# Patient Record
Sex: FEMALE | Race: WHITE | Employment: OTHER | URBAN - METROPOLITAN AREA
[De-identification: names, ages, dates, MRNs, and addresses within clinical notes are randomized per-mention and may not be internally consistent; named-entity substitution may affect disease eponyms.]

---

## 2018-05-29 ENCOUNTER — HOSPITAL ENCOUNTER (OUTPATIENT)
Dept: PHYSICAL THERAPY | Age: 68
Discharge: HOME | End: 2018-05-29
Payer: MEDICARE

## 2018-05-29 PROCEDURE — 97140 MANUAL THERAPY 1/> REGIONS: CPT | Performed by: PHYSICAL THERAPIST

## 2018-05-29 PROCEDURE — G8984 CARRY CURRENT STATUS: HCPCS | Performed by: PHYSICAL THERAPIST

## 2018-05-29 PROCEDURE — 97162 PT EVAL MOD COMPLEX 30 MIN: CPT | Performed by: PHYSICAL THERAPIST

## 2018-05-29 PROCEDURE — G8985 CARRY GOAL STATUS: HCPCS | Performed by: PHYSICAL THERAPIST

## 2018-05-29 PROCEDURE — 97110 THERAPEUTIC EXERCISES: CPT | Performed by: PHYSICAL THERAPIST

## 2018-05-29 NOTE — PROGRESS NOTES
PT DAILY TREATMENT NOTE - [facility]     Patient Name: Dayan Mariano  Date:2018  : 1950  [x]  Patient  Verified  Payor: Simeon Man / Plan: [place] MEDICARE PART A & B / Product Type: Medicare /    In time:1204  Out time:1238  Total Treatment Time (min): 34  Total Timed Codes (min): 25  1:1 Treatment Time ( [address] only): 34   Visit #: 1 of 16    Treatment Area: Left shoulder pain [M25.512]    SUBJECTIVE  Pain Level (0-10 scale): 0/10  Any medication changes, allergies to medications, adverse drug reactions, diagnosis change, or new procedure performed?: [x] No    [] Yes (see summary sheet for update)  Subjective functional status/changes:   [] No changes reported  HPI: Pt c/o decreased mobility but limited pain in the left shoulder s/p TSA on 18. Reports minimal pain following sx and completion of HHPT x3 weeks w/ DC yesterday. Reports she is ambulating around the house w/o sling as instructed by surgeon but continues w/ sling use for community navigation and sleeping. Reports use of ice to alleviate soreness and notes she only really gets pain if she makes a quick movement. Reports she is retired and lives w/ her  who is able to assist w/ ADLs as necessary. Reports hx of right shoulder scope and scoliosis. States she had a left shoulder scope in 2017 w/ cortisone injection that kept her pain away for 8-10 weeks.      OBJECTIVE    Modality rationale:  to improve the patients ability to    Min Type Additional Details    [] Estim:  []Unatt       []IFC  []Premod                        []Other:  []w/ice   []w/heat  Position:  Location:    [] Estim: []Att    []TENS instruct  []NMES                    []Other:  []w/US   []w/ice   []w/heat  Position:  Location:    []  Traction: [] Cervical       []Lumbar                       [] Prone          []Supine                       []Intermittent   []Continuous Lbs:  [] before manual  [] after manual    []  Ultrasound: []Continuous   [] Pulsed []1MHz   []3MHz W/cm2:  Location:    []  Iontophoresis with dexamethasone         Location: [] Take home patch   [] In clinic    []  Ice     []  heat  []  Ice massage  []  Laser   []  Anodyne Position:  Location:    []  Laser with stim  []  Other:  Position:  Location:    []  Vasopneumatic Device Pressure:       [] lo [] med [] hi   Temperature: [] lo [] med [] hi   [] Skin assessment post-treatment:  []intact []redness- no adverse reaction    []redness  adverse reaction:     9 min [x]Eval                  []Re-Eval       15 min Therapeutic Exercise:  [] See flow sheet : reviewed and progressed HEP, educated in mechanics, scar massage, and avoiding pain   Rationale: increase ROM, increase strength, improve coordination and increase proprioception to improve the patients ability to improve pain and mobility      min Therapeutic Activity:  []  See flow sheet :   Rationale:   to improve the patients ability to       min Neuromuscular Re-education:  []  See flow sheet :   Rationale:   to improve the patients ability to     10 min Manual Therapy:  STJ mobs, TPR to left UT and LS, manual str in pain free protocol approved ranges flex and scap, ER to neutral at neutral   Rationale: decrease pain, increase ROM, increase tissue extensibility, decrease trigger points and increase postural awareness to decrease pain and improve activity tolerance      min Gait Training:  ___ feet with ___ device on level surfaces with ___ level of assist   Rationale: With   [] TE   [] TA   [] neuro   [] other: Patient Education: [x] Review HEP    [] Progressed/Changed HEP based on:   [] positioning   [] body mechanics   [] transfers   [] heat/ice application    [] other:      Other Objective/Functional Measures: Pt presents w/ sling correctly donned. Incision is healing well w/o scabs or signs of infection. PROM left shoulder flex to 120 deg scap to 90 deg, ER to neutral at neutral. Full elbow and wrist AROM noted. Cervical SB to the right is limited about 25% w/ pulling reported in the left neck and upper shoulder. Pt able to demonstrate correct pendulums and good transfers. Pain Level (0-10 scale) post treatment: 0/10    ASSESSMENT/Changes in Function: Focus on restoring AROM and strength to left shoulder following protocol restrictions. Patient will continue to benefit from skilled PT services to modify and progress therapeutic interventions, address functional mobility deficits, address ROM deficits, address strength deficits, analyze and address soft tissue restrictions, analyze and cue movement patterns, analyze and modify body mechanics/ergonomics, assess and modify postural abnormalities and instruct in home and community integration to attain remaining goals. [x]  See Plan of Care  []  See progress note/recertification  []  See Discharge Summary         Progress towards goals / Updated goals:  Short Term Goals: To be accomplished in 3 weeks:  1. Pt will be independent and complaint w/ HEP to progress gains in PT. At eval: reviewed and progressed HEP  2. Pt will improve PROM to full flex and scap left shoulder for ease w/ sleeping. At eval: PROM left shoulder flex to 120 deg, scap to 90 deg  3. Pt will improve AAROM to > or = to 120 deg flex and scap as measured on pulleys for ease w/ dressing. At eval: AAROM flex supine w/ wand = 140 deg, scap standing w/ wand 60 deg  Long Term Goals: To be accomplished in 8 weeks:  1. Pt will improve FOTO to > or = to 63 to demo improved function. At eval: FOTO = 36  2. Pt will improve AROM right shoulder flex and scap to > or = to 140 deg for ease w/ doing her hair. At eval: NT  3. Pt will improve AROM right shoulder functional ER to > or = to occiput for ease putting on her shirt. At eval: NT  4. Pt will improve MMT right shoulder to > or = to -4-4/5 grossly for ease w/ return to unrestricted ADLs.    At eval: NT    PLAN  []  Upgrade activities as tolerated []  Continue plan of care  []  Update interventions per flow sheet       []  Discharge due to:_  [x]  Other: 2x/8 weeks     Orlando Manjarrez, PT 5/29/2018  12:55 PM    Future Appointments  Date Time Provider Claire Johnson   6/1/2018 11:30 AM Escalante Shines, PTA [facility] [address]   6/6/2018 10:30 AM Escalante Shines, PTA [facility] [address]   6/12/2018 8:30 AM Murmoses Montelongo, PT [facility] [address]   6/14/2018 9:00 AM Escalante Shines, PTA [facility] [address]   6/19/2018 9:30 AM Escalante Shines, PTA [facility] [address]   6/21/2018 8:30 AM Denise Montelongo, PT [facility] [address]   6/26/2018 8:30 AM Escalante Shines, PTA [facility] [address]   6/29/2018 8:30 AM Escalante Shines, PTA [facility] [address]   7/3/2018 8:30 AM Murmoses Montelongo, PT [facility] [address]   7/6/2018 8:30 AM Escalante Shines, PTA [facility] [address]   7/10/2018 8:30 AM Escalante Shines, PTA [facility] [address]   7/13/2018 8:30 AM Escalante Shines, PTA [facility] [address]   7/17/2018 8:30 AM Escalante Shines, PTA [facility] [address]   7/19/2018 9:30 AM Orlando Manjarrez, PT [facility] [address]   7/24/2018 8:30 AM Escalante Shines, PTA [facility] [address]   7/26/2018 8:30 AM Denise Montelongo, PT [facility] [address]

## 2018-05-29 NOTE — PROGRESS NOTES
[facility]              [address]        [place], [address]   [phone] fax    Plan of Care/ Statement of Necessity for Physical Therapy Services    Patient name: Silvino Duong Start of Care: 2018   Referral source: Xu Huang, * : 1950    Medical Diagnosis: Left shoulder pain [M25.512]   Onset Date:18    Treatment Diagnosis: s/p right TSA   Prior Hospitalization: see medical history Provider#: [id number]   Medications: Verified on Patient summary List    Comorbidities: arthritis, back pain, GI disease, HBP, sleep dysfunction    Prior Level of Function: Hx of left shoulder pain, right hand dominant, (I) w/ ADLs and MADLs, retired       [employer] of Care and following information is based on the information from the initial evaluation. Assessment/ key information: Pt is a 68 y/o female w/c/o decreased mobility but limited pain in the left shoulder s/p TSA on 18. Reports minimal pain following sx and completion of HHPT x3 weeks w/ DC yesterday. Reports she is ambulating around the house w/o sling as instructed by surgeon but continues w/ sling use for community navigation and sleeping. Reports use of ice to alleviate soreness and notes she only really gets pain if she makes a quick movement. Reports she is retired and lives w/ her  who is able to assist w/ ADLs as necessary. Reports hx of right shoulder scope and scoliosis. States she had a left shoulder scope in 2017 w/ cortisone injection that kept her pain away for 8-10 weeks. Pt presents w/ sling correctly donned. Incision is healing well w/o scabs or signs of infection. PROM left shoulder flex to 120 deg scap to 90 deg, ER to neutral at neutral. Full elbow and wrist AROM noted. Cervical SB to the right is limited about 25% w/ pulling reported in the left neck and upper shoulder. Pt able to demonstrate correct pendulums and good transfers.  Pt will benefit from skilled PT to address the deficits and progress with pt goals. Evaluation Complexity History MEDIUM  Complexity : 1-2 comorbidities / personal factors will impact the outcome/ POC ; Examination HIGH Complexity : 4+ Standardized tests and measures addressing body structure, function, activity limitation and / or participation in recreation  ;Presentation MEDIUM Complexity : Evolving with changing characteristics  ; Clinical Decision Making MEDIUM Complexity : FOTO score of 26-74  Overall Complexity Rating: MEDIUM  Problem List: pain affecting function, decrease ROM, decrease strength, decrease ADL/ functional abilitiies, decrease activity tolerance and decrease flexibility/ joint mobility   Treatment Plan may include any combination of the following: Therapeutic exercise, Therapeutic activities, Neuromuscular re-education, Physical agent/modality, Manual therapy, Patient education and Functional mobility training  Patient / Family readiness to learn indicated by: asking questions, trying to perform skills and interest  Persons(s) to be included in education: patient (P)  Barriers to Learning/Limitations: None  Patient Goal (s): get back to normal  Patient Self Reported Health Status: excellent  Rehabilitation Potential: good    Short Term Goals: To be accomplished in 3 weeks:  1. Pt will be independent and complaint w/ HEP to progress gains in PT.   2. Pt will improve PROM to full flex and scap left shoulder for ease w/ sleeping. 3. Pt will improve AAROM to > or = to 120 deg flex and scap as measured on pulleys for ease w/ dressing. Long Term Goals: To be accomplished in 8 weeks:  1. Pt will improve FOTO to > or = to 63 to demo improved function. 2. Pt will improve AROM right shoulder flex and scap to > or = to 140 deg for ease w/ doing her hair. 3. Pt will improve AROM right shoulder functional ER to > or = to occiput for ease putting on her shirt.    4. Pt will improve MMT right shoulder to > or = to -4-4/5 grossly for ease w/ return to unrestricted ADLs. Frequency / Duration: Patient to be seen 2 times per week for 8 weeks. Patient/ Caregiver education and instruction: Diagnosis, prognosis, activity modification and exercises   [x]  Plan of care has been reviewed with PTA    G-Codes (GP)  Carry   Current  CL= 60-79%    Goal  CJ= 20-39%    The severity rating is based on clinical judgment and the FOTO score. Certification Period: 5/29/18 - 8/27/18  Fozia Freire, PT 5/29/2018 1:30 PM  ________________________________________________________________________    I certify that the above Therapy Services are being furnished while the patient is under my care. I agree with the treatment plan and certify that this therapy is necessary.     [de-identified] Signature:____________________  Date:____________Time: _________    Please sign and return to [facility]              [address], [address]   [phone] fax

## 2018-06-01 ENCOUNTER — HOSPITAL ENCOUNTER (OUTPATIENT)
Dept: PHYSICAL THERAPY | Age: 68
Discharge: HOME | End: 2018-06-01
Payer: MEDICARE

## 2018-06-01 PROCEDURE — 97140 MANUAL THERAPY 1/> REGIONS: CPT

## 2018-06-01 PROCEDURE — 97110 THERAPEUTIC EXERCISES: CPT

## 2018-06-01 NOTE — PROGRESS NOTES
PT DAILY TREATMENT NOTE - [facility]     Patient Name: Brionna Calix  Date:2018  : 1950  [x]  Patient  Verified  Payor: [place] MEDICARE / Plan: [place] MEDICARE PART A & B / Product Type: Medicare /    In time:11:28  Out time:12:15  Total Treatment Time (min): 47  Total Timed Codes (min): 47  1:1 Treatment Time ( [address] only): 25   Visit #: 2 of 16    Treatment Area: Left shoulder pain [M25.512]    SUBJECTIVE  Pain Level (0-10 scale): 0  Any medication changes, allergies to medications, adverse drug reactions, diagnosis change, or new procedure performed?: [x] No    [] Yes (see summary sheet for update)  Subjective functional status/changes:   [] No changes reported  Pt reports that her arm is feeling and doing well. OBJECTIVE        Min Type Additional Details    [] Estim:  []Unatt       []IFC  []Premod                        []Other:  []w/ice   []w/heat  Position:  Location:    [] Estim: []Att    []TENS instruct  []NMES                    []Other:  []w/US   []w/ice   []w/heat  Position:  Location:    []  Traction: [] Cervical       []Lumbar                       [] Prone          []Supine                       []Intermittent   []Continuous Lbs:  [] before manual  [] after manual    []  Ultrasound: []Continuous   [] Pulsed                           []1MHz   []3MHz W/cm2:  Location:    []  Iontophoresis with dexamethasone         Location: [] Take home patch   [] In clinic    []  Ice     []  heat  []  Ice massage  []  Laser   []  Anodyne Position:  Location:    []  Laser with stim  []  Other:  Position:  Location:    []  Vasopneumatic Device Pressure:       [] lo [] med [] hi   Temperature: [] lo [] med [] hi   [] Skin assessment post-treatment:  []intact []redness- no adverse reaction    []redness  adverse reaction:       37 min Therapeutic Exercise:  [x] See flow sheet :   Rationale: increase ROM and increase strength to improve the patients ability to increase tolerance to activities.        10 min Manual Therapy:  STJ mobs, TPR to left UT and LS, manual str in pain free protocol approved ranges flex and scap, ER to neutral at neutral   Rationale: decrease pain, increase ROM, increase tissue extensibility and decrease trigger points to increase ease with ADLs. With   [] TE   [] TA   [] neuro   [] other: Patient Education: [x] Review HEP    [] Progressed/Changed HEP based on:   [] positioning   [] body mechanics   [] transfers   [] heat/ice application    [] other:      Other Objective/Functional Measures: Pt reports performing HEP     Pain Level (0-10 scale) post treatment: 0    ASSESSMENT/Changes in Function: Initiated exercises per POC. Pt responded well to exercises with no increase in pain. Patient will continue to benefit from skilled PT services to modify and progress therapeutic interventions, address functional mobility deficits, address ROM deficits, address strength deficits and analyze and address soft tissue restrictions to attain remaining goals. []  See Plan of Care  []  See progress note/recertification  []  See Discharge Summary         Progress towards goals / Updated goals:  Short Term Goals: To be accomplished in 3 weeks:  1. Pt will be independent and complaint w/ HEP to progress gains in PT. At eval: reviewed and progressed HEP  Current: Goal met: Pt reports performing HEP. 6/1/18  2. Pt will improve PROM to full flex and scap left shoulder for ease w/ sleeping. At eval: PROM left shoulder flex to 120 deg, scap to 90 deg  3. Pt will improve AAROM to > or = to 120 deg flex and scap as measured on pulleys for ease w/ dressing. At eval: AAROM flex supine w/ wand = 140 deg, scap standing w/ wand 60 deg  Long Term Goals: To be accomplished in 8 weeks:  1. Pt will improve FOTO to > or = to 63 to demo improved function. At eval: FOTO = 36  2. Pt will improve AROM right shoulder flex and scap to > or = to 140 deg for ease w/ doing her hair. At eval: NT  3.  Pt will improve AROM right shoulder functional ER to > or = to occiput for ease putting on her shirt. At eval: NT  4. Pt will improve MMT right shoulder to > or = to -4-4/5 grossly for ease w/ return to unrestricted ADLs.    At eval: NT    PLAN  []  Upgrade activities as tolerated     [x]  Continue plan of care  []  Update interventions per flow sheet       []  Discharge due to:_  []  Other:_      Boris Roy PTA 6/1/2018  11:38 AM    Future Appointments  Date Time Provider Claire Johnson   6/6/2018 10:30 AM Boris Roy, PTA [facility]   6/12/2018 8:30 AM Denise Montelongo, PT [facility]   6/14/2018 9:00 AM Boris Roy, PTA [facility]   6/19/2018 9:30 AM Boris Roy, PTA [facility]   6/21/2018 8:30 AM Denise Montelongo, PT [facility]   6/26/2018 8:30 AM Boris Roy, PTA [facility]   6/29/2018 8:30 AM Boris Roy, PTA [facility]   7/3/2018 8:30 AM Denise Montelongo, PT [facility]   7/6/2018 8:30 AM Escalante Kirill, PTA [facility]   7/10/2018 8:30 AM Escalante Kirill, PTA [facility]   7/13/2018 8:30 AM Escalante Kirill, PTA [facility]   7/17/2018 8:30 AM Boris Roy, PTA [facility]   7/19/2018 9:30 AM Orlando Manjarrez, PT [facility]   7/24/2018 8:30 AM Boris Roy, PTA [facility]   7/26/2018 8:30 AM Denise Montelongo PT [facility]

## 2018-06-06 ENCOUNTER — HOSPITAL ENCOUNTER (OUTPATIENT)
Dept: PHYSICAL THERAPY | Age: 68
Discharge: HOME | End: 2018-06-06
Payer: MEDICARE

## 2018-06-06 PROCEDURE — 97110 THERAPEUTIC EXERCISES: CPT

## 2018-06-06 PROCEDURE — 97140 MANUAL THERAPY 1/> REGIONS: CPT

## 2018-06-06 NOTE — PROGRESS NOTES
PT DAILY TREATMENT NOTE - [facility]     Patient Name: Gilberto Yancey  Date:2018  : 1950  [x]  Patient  Verified  Payor: VA MEDICARE / Plan: [place] MEDICARE PART A & B / Product Type: Medicare /    In time:10:26  Out time:11:10  Total Treatment Time (min): 44  Total Timed Codes (min): 44  1:1 Treatment Time ( [address] only): 35   Visit #: 3 of 16    Treatment Area: Left shoulder pain [M25.512]    SUBJECTIVE  Pain Level (0-10 scale): 0  Any medication changes, allergies to medications, adverse drug reactions, diagnosis change, or new procedure performed?: [x] No    [] Yes (see summary sheet for update)  Subjective functional status/changes:   [] No changes reported  Pt reports that after last session late that night her arm was achy. OBJECTIVE        Min Type Additional Details    [] Estim:  []Unatt       []IFC  []Premod                        []Other:  []w/ice   []w/heat  Position:  Location:    [] Estim: []Att    []TENS instruct  []NMES                    []Other:  []w/US   []w/ice   []w/heat  Position:  Location:    []  Traction: [] Cervical       []Lumbar                       [] Prone          []Supine                       []Intermittent   []Continuous Lbs:  [] before manual  [] after manual    []  Ultrasound: []Continuous   [] Pulsed                           []1MHz   []3MHz W/cm2:  Location:    []  Iontophoresis with dexamethasone         Location: [] Take home patch   [] In clinic    []  Ice     []  heat  []  Ice massage  []  Laser   []  Anodyne Position:  Location:    []  Laser with stim  []  Other:  Position:  Location:    []  Vasopneumatic Device Pressure:       [] lo [] med [] hi   Temperature: [] lo [] med [] hi   [] Skin assessment post-treatment:  []intact []redness- no adverse reaction    []redness  adverse reaction:           34 min Therapeutic Exercise:  [x] See flow sheet :   Rationale: increase ROM and increase strength to improve the patients ability to increase tolerance to activities.       10 min Manual Therapy:  STJ mobs, TPR to left UT and LS, manual str in pain free protocol approved ranges flex and scap, ER to neutral at neutral   Rationale: decrease pain, increase ROM, increase tissue extensibility and decrease trigger points to increase ease with ADLs.               With   [] TE   [] TA   [] neuro   [] other: Patient Education: [x] Review HEP    [] Progressed/Changed HEP based on:   [] positioning   [] body mechanics   [] transfers   [] heat/ice application    [] other:      Other Objective/Functional Measures: PROM left shoulder flexion 130 deg, scap to 90 deg     Pain Level (0-10 scale) post treatment: 0    ASSESSMENT/Changes in Function: Continue exercises per flow sheet to follow protocol. With PROM flexion pt tightness with scapular winging. Patient will continue to benefit from skilled PT services to modify and progress therapeutic interventions, address functional mobility deficits, address ROM deficits, address strength deficits and analyze and address soft tissue restrictions to attain remaining goals. []  See Plan of Care  []  See progress note/recertification  []  See Discharge Summary         Progress towards goals / Updated goals:  Short Term Goals: To be accomplished in 3 weeks:  1. Pt will be independent and complaint w/ HEP to progress gains in PT. At eval: reviewed and progressed HEP  Current: Goal met: Pt reports performing HEP. 6/1/18  2. Pt will improve PROM to full flex and scap left shoulder for ease w/ sleeping. At eval: PROM left shoulder flex to 120 deg, scap to 90 deg  Current: Not met:  PROM left shoulder flexion 130 deg, scap to 90 deg. 6/6/18  3. Pt will improve AAROM to > or = to 120 deg flex and scap as measured on pulleys for ease w/ dressing. At eval: AAROM flex supine w/ wand = 140 deg, scap standing w/ wand 60 deg  Long Term Goals: To be accomplished in 8 weeks:  1. Pt will improve FOTO to > or = to 63 to demo improved function.    At eval: FOTO = 36  2. Pt will improve AROM right shoulder flex and scap to > or = to 140 deg for ease w/ doing her hair. At eval: NT  3. Pt will improve AROM right shoulder functional ER to > or = to occiput for ease putting on her shirt. At eval: NT  4. Pt will improve MMT right shoulder to > or = to -4-4/5 grossly for ease w/ return to unrestricted ADLs.    At eval: NT    PLAN  []  Upgrade activities as tolerated     [x]  Continue plan of care  []  Update interventions per flow sheet       []  Discharge due to:_  []  Other:_      Rhoda Janice, PTA 6/6/2018  10:42 AM    Future Appointments  Date Time Provider Claire Johnson   6/7/2018 8:30 AM Melquiades Owens, PT [facility]   6/12/2018 8:30 AM Melquiades Owens, PT [facility]   6/14/2018 9:00 AM Rhoda Janice, PTA [facility]   6/19/2018 11:00 AM Melquiades Owens, PT [facility]   6/21/2018 8:30 AM Melquiades Owens, PT [facility]   6/26/2018 8:30 AM Rhoda Janice, PTA [facility]   6/29/2018 8:30 AM Rhoda Janice, PTA [facility]   7/3/2018 8:30 AM Melquiades Owens, PT [facility]   7/6/2018 8:30 AM Rhoda Janice, PTA [facility]   7/10/2018 8:30 AM Rhoda Janice, PTA [facility]   7/13/2018 8:30 AM Rhoda Janice, PTA [facility]   7/17/2018 8:30 AM Rhoda Janice, PTA [facility]   7/19/2018 9:30 AM Shilpa Solares, PT [facility]   7/24/2018 8:30 AM Rhoda Camacho, PTA [facility]   7/26/2018 8:30 AM Melquiades Owens, PT [facility]

## 2018-06-07 ENCOUNTER — HOSPITAL ENCOUNTER (OUTPATIENT)
Dept: PHYSICAL THERAPY | Age: 68
Discharge: HOME | End: 2018-06-07
Payer: MEDICARE

## 2018-06-07 PROCEDURE — 97112 NEUROMUSCULAR REEDUCATION: CPT

## 2018-06-07 PROCEDURE — 97110 THERAPEUTIC EXERCISES: CPT

## 2018-06-07 PROCEDURE — 97140 MANUAL THERAPY 1/> REGIONS: CPT

## 2018-06-07 NOTE — PROGRESS NOTES
PT DAILY TREATMENT NOTE - [facility]     Patient Name: Silvino Duong  Date:2018  : 1950  [x]  Patient  Verified  Payor: Carmina Hidalgo / Plan: [place] MED[place] & B / Product Type: Medicare /    In time:0830  Out GCYO:030  Total Treatment Time (min): 44  Total Timed Codes (min): 44  1:1 Treatment Time ( only): 40   Visit #: 4 of 16    Treatment Area: Left shoulder pain [M25.512]    SUBJECTIVE  Pain Level (0-10 scale): 0  Any medication changes, allergies to medications, adverse drug reactions, diagnosis change, or new procedure performed?: [x] No    [] Yes (see summary sheet for update)  Subjective functional status/changes:   [] No changes reported  Patient reports continued difficulty with sleep. OBJECTIVE    20 min Therapeutic Exercise:  [x] See flow sheet : Emphasis placed on improving available shoulder AROM and strength of the rotator cuff muscles   Rationale: increase ROM and increase strength to improve the patients ability to improve ease with household ADLs    14 min Neuromuscular Re-education:  [x]  See flow sheet : Emphasis placed on improving activation and recruitment of the glenohumeral and scapulothoracic musculature and improving scapulohumeral mechanics with UE elevation   Rationale: increase ROM, increase strength and increase proprioception  to improve the patients ability to improve ease with return to exercise.     10 min Manual Therapy:    Supine, Left Scapular Medial Grade IV Mobilization with 70 deg Shoulder Scap  Supine, Left Shoulder Scaption Grade II-III  Physiological Mobilization  Supine, Left Shoulder Flexion Grade II-III Physiological Mobilization  Supine, Left Shoulder ER PROM (0 deg abd) - To 15deg  Right Sidelying, Left Scapular PNF with Isometric and Concentric Resistance   Rationale: decrease pain, increase ROM and increase tissue extensibility to improve ease with overhead ADLs    With   [] TE   [] TA   [] neuro   [] other: Patient Education: [x] Review HEP    [] Progressed/Changed HEP based on:   [] positioning   [] body mechanics   [] transfers   [] heat/ice application    [] other:      Pain Level (0-10 scale) post treatment: 0    ASSESSMENT/Changes in Function: Continued diminished scapulohumeral dissociation with shoulder PROM >90 degrees elevation. Patient to be 5 weeks post-operative Monday, 6/12/2018 with progression of within clinic treatment per protocol. Patient will continue to benefit from skilled PT services to modify and progress therapeutic interventions, address functional mobility deficits, address ROM deficits, address strength deficits, analyze and address soft tissue restrictions and analyze and cue movement patterns to attain remaining goals. []  See Plan of Care  []  See progress note/recertification  []  See Discharge Summary         Progress towards goals / Updated goals:    Short Term Goals: To be accomplished in 3 weeks:  1. Pt will be independent and complaint w/ HEP to progress gains in PT. At eval: reviewed and progressed HEP  Current: Goal met: Pt reports performing HEP. 6/1/18  2. Pt will improve PROM to full flex and scap left shoulder for ease w/ sleeping. At eval: PROM left shoulder flex to 120 deg, scap to 90 deg  Current: Not met:  PROM left shoulder flexion 130 deg, scap to 90 deg. 6/6/18  3. Pt will improve AAROM to > or = to 120 deg flex and scap as measured on pulleys for ease w/ dressing. At eval: AAROM flex supine w/ wand = 140 deg, scap standing w/ wand 60 deg  Long Term Goals: To be accomplished in 8 weeks:  1. Pt will improve FOTO to > or = to 63 to demo improved function. At eval: FOTO = 36  2. Pt will improve AROM right shoulder flex and scap to > or = to 140 deg for ease w/ doing her hair. At eval: NT  3. Pt will improve AROM right shoulder functional ER to > or = to occiput for ease putting on her shirt. At eval: NT  4.  Pt will improve MMT right shoulder to > or = to -4-4/5 grossly for ease w/ return to unrestricted ADLs.    At eval: NT    PLAN  []  Upgrade activities as tolerated     []  Continue plan of care  []  Update interventions per flow sheet       []  Discharge due to:_  []  Other:_      Marilyn Lanza, PT 6/7/2018  7:57 AM    Future Appointments  Date Time Provider Claire Elizabeth   6/7/2018 8:30 AM Marilyn Lanza, PT [facility]   6/12/2018 8:30 AM Marilyn Lanza, PT [facility]   6/14/2018 9:00 AM Pretty Main, PTA [facility]   6/19/2018 11:00 AM Marilyn Lanza, PT [facility]   6/21/2018 8:30 AM Marilyn Lanza, PT [facility]   6/26/2018 8:30 AM Pretty Main, PTA [facility]   6/29/2018 8:30 AM Marilyn Lanza, PT [facility]   7/3/2018 8:30 AM Marilyn Lanza, PT [facility]   7/6/2018 8:30 AM Pretty Main, PTA [facility]   7/10/2018 8:30 AM Pretty Main, PTA [facility]   7/13/2018 8:30 AM Pretty Main, PTA [facility]   7/17/2018 8:30 AM Pretty Main, PTA [facility]   7/19/2018 9:30 AM Elighnano Martinez, PT [facility]   7/24/2018 8:30 AM Pretty Main, PTA [facility]   7/26/2018 8:30 AM Marilyn Lanza, PT [facility]

## 2018-06-12 ENCOUNTER — HOSPITAL ENCOUNTER (OUTPATIENT)
Dept: PHYSICAL THERAPY | Age: 68
Discharge: HOME | End: 2018-06-12
Payer: MEDICARE

## 2018-06-12 PROCEDURE — 97112 NEUROMUSCULAR REEDUCATION: CPT

## 2018-06-12 PROCEDURE — 97110 THERAPEUTIC EXERCISES: CPT

## 2018-06-12 PROCEDURE — 97140 MANUAL THERAPY 1/> REGIONS: CPT

## 2018-06-12 NOTE — PROGRESS NOTES
PT DAILY TREATMENT NOTE - [facility]     Patient Name: Giana Gray  Date:2018  : 1950  [x]  Patient  Verified  Payor: Zandra Plan / Plan: [place] MEDICARE PART A & B / Product Type: Medicare /    In time:830  Out time:918  Total Treatment Time (min): 48  Total Timed Codes (min): 48  1:1 Treatment Time ( [address] only): 40   Visit #: 5 of 16    Treatment Area: Left shoulder pain [M25.512]    SUBJECTIVE  Pain Level (0-10 scale): 0  Any medication changes, allergies to medications, adverse drug reactions, diagnosis change, or new procedure performed?: [x] No    [] Yes (see summary sheet for update)  Subjective functional status/changes:   [] No changes reported  Patient reports desire to return back to driving upon MD follow up next week.     OBJECTIVE    20 min Therapeutic Exercise:  [x] See flow sheet : Emphasis placed on improving available shoulder AROM and strength of the rotator cuff muscles   Rationale: increase ROM and increase strength to improve the patients ability to improve ease with household ADLs     16 min Neuromuscular Re-education:  [x]  See flow sheet : Emphasis placed on improving activation and recruitment of the glenohumeral and scapulothoracic musculature and improving scapulohumeral mechanics with UE elevation   Rationale: increase ROM, increase strength and increase proprioception  to improve the patients ability to improve ease with return to exercise.     12 min Manual Therapy:    Supine, Left Scapular Medial Grade IV Mobilization with 70 deg Shoulder Scap  Supine, Left Shoulder Scaption Grade II-III  Physiological Mobilization  Supine, Left Shoulder Flexion Grade II-III Physiological Mobilization  Supine, Left Shoulder ER PROM (0 deg abd) - To 15deg  Supine, Left UE Multi-Directional Rhythmic Stabilization (90 deg flexion, scapular protraction - Applied at wrist)   Rationale: decrease pain, increase ROM and increase tissue extensibility to improve ease with overhead ADLs        With   [] TE   [] TA   [] neuro   [] other: Patient Education: [x] Review HEP    [] Progressed/Changed HEP based on:   [] positioning   [] body mechanics   [] transfers   [] heat/ice application    [] other:      Pain Level (0-10 scale) post treatment: 1    ASSESSMENT/Changes in Function: Good post-surgical improvement in left shoulder PROM demonstrated with continued empty end-feels noted at end-range. With patient >5 weeks post-operative further progressed exercises ensuring maintenance of scapulohumeral rhythm with progression. Patient will continue to benefit from skilled PT services to modify and progress therapeutic interventions, address functional mobility deficits, address ROM deficits, address strength deficits, analyze and address soft tissue restrictions, analyze and cue movement patterns, analyze and modify body mechanics/ergonomics and assess and modify postural abnormalities to attain remaining goals. []  See Plan of Care  []  See progress note/recertification  []  See Discharge Summary         Progress towards goals / Updated goals:    Short Term Goals: To be accomplished in 3 weeks:  1. Pt will be independent and complaint w/ HEP to progress gains in PT. At eval: reviewed and progressed HEP  Current: Goal met: Pt reports performing HEP. 6/1/18  2. Pt will improve PROM to full flex and scap left shoulder for ease w/ sleeping. At eval: PROM left shoulder flex to 120 deg, scap to 90 deg  Current: Progressing, Flexion 130 degrees, Scaption 150 degrees, 6/12/2018  3. Pt will improve AAROM to > or = to 120 deg flex and scap as measured on pulleys for ease w/ dressing. At eval: AAROM flex supine w/ wand = 140 deg, scap standing w/ wand 60 deg  Long Term Goals: To be accomplished in 8 weeks:  1. Pt will improve FOTO to > or = to 63 to demo improved function. At eval: FOTO = 36  2. Pt will improve AROM right shoulder flex and scap to > or = to 140 deg for ease w/ doing her hair. At eval: NT  3.  Pt will improve AROM right shoulder functional ER to > or = to occiput for ease putting on her shirt. At eval: NT  4. Pt will improve MMT right shoulder to > or = to -4-4/5 grossly for ease w/ return to unrestricted ADLs.    At eval: NT    PLAN  []  Upgrade activities as tolerated     []  Continue plan of care  []  Update interventions per flow sheet       []  Discharge due to:_  []  Other:_      Donavan Walters PT 6/12/2018  8:19 AM    Future Appointments  Date Time Provider Claire Johnson   6/12/2018 8:30 AM Donavan Walters, PT [facility]   6/14/2018 9:00 AM Turner Galvan, PTA [facility]   6/19/2018 11:00 AM Donavan Walters, PT [facility]   6/21/2018 8:30 AM Donavan Walters, PT [facility]   6/26/2018 8:30 AM Turner Galvan, PTA [facility]   7/3/2018 8:30 AM Donavan Walters, PT [facility]   7/6/2018 8:30 AM Turner Galvan, PTA [facility]   7/10/2018 8:30 AM Turner Galvan, PTA [facility]   7/13/2018 8:30 AM Turner Galvan, PTA [facility]   7/17/2018 8:30 AM Turner Galvan, PTA [facility]   7/19/2018 9:30 AM Connie Velásquez, PT [facility]   7/24/2018 8:30 AM Turner Galvan, PTA [facility]   7/26/2018 8:30 AM Donavan Walters, PT [facility]

## 2018-06-14 ENCOUNTER — HOSPITAL ENCOUNTER (OUTPATIENT)
Dept: PHYSICAL THERAPY | Age: 68
Discharge: HOME | End: 2018-06-14
Payer: MEDICARE

## 2018-06-14 PROCEDURE — G8984 CARRY CURRENT STATUS: HCPCS

## 2018-06-14 PROCEDURE — 97140 MANUAL THERAPY 1/> REGIONS: CPT

## 2018-06-14 PROCEDURE — 97110 THERAPEUTIC EXERCISES: CPT

## 2018-06-14 PROCEDURE — G8985 CARRY GOAL STATUS: HCPCS

## 2018-06-14 NOTE — PROGRESS NOTES
[facility]              [address]        [place], [address]   [phone] fax    Medicare Progress Report    Patient name: Shala Aguayo Start of Care: 2018   Referral source: Pascale Gaviria, * : 1950   Medical/Treatment Diagnosis: Left shoulder pain [M25.512] Onset Date:2018     Prior Hospitalization: see medical history Provider#: [id number]   Medications: Verified on Patient Summary List    Comorbidities: arthritis, back pain, GI disease, HBP, sleep dysfunction    Prior Level of Function: Hx of left shoulder pain, right hand dominant, (I) w/ ADLs and MADLs, retired   Visits from [place] of Care: 6    Missed Visits: 0    Reporting Period: 2018 to 2018    Subjective Reports:     Short Term Goals: To be accomplished in 3 weeks:  1. Pt will be independent and complaint w/ HEP to progress gains in PT. At eval: reviewed and progressed HEP  At PN: Goal met: Pt reports performing HEP  2. Pt will improve PROM to full flex and scap left shoulder for ease w/ sleeping. At eval: PROM left shoulder flex to 120 deg, scap to 90 deg  At PN: Progressing, Flexion 130 degrees, Scaption 150 degrees  3. Pt will improve AAROM to > or = to 120 deg flex and scap as measured on pulleys for ease w/ dressing. At eval: AAROM flex supine w/ wand = 140 deg, scap standing w/ wand 60 deg  At PN: Goal met: AAROM flexion and scaption 125 deg with pulleys  Long Term Goals: To be accomplished in 8 weeks:  1. Pt will improve FOTO to > or = to 63 to demo improved function. At eval: FOTO = 36  At PN: Progressing: FOTO = 50, increased by 14 since [facility]  2. Pt will improve AROM right shoulder flex and scap to > or = to 140 deg for ease w/ doing her hair. At eval: NT  At PN: Progressing: AROM flexion 55 deg, scap 50 deg  3. Pt will improve AROM right shoulder functional ER to > or = to occiput for ease putting on her shirt. At eval: NT  4.  Pt will improve MMT right shoulder to > or = to -4-4/5 grossly for ease w/ return to unrestricted ADLs. At eval: NT    Key functional changes: See above goals. Problems/ barriers to goal attainment: None. Assessment / Recommendations:    Pt has progressed well toward LTGs. Pt's PROM left shoulder flexion 130 deg, scaption 150 deg. Pt's AAROM with pulleys flexion and scaption 125 deg. Pt's AROM flexion 55 deg and scaption 50 deg. Pt provided with updated HEP to progress isometric strengthening at home. Patient with good tolerance with progression of exercises in accordance with protocol.      Patient will continue to benefit from skilled PT services to modify and progress therapeutic interventions, address functional mobility deficits, address ROM deficits, address strength deficits and analyze and address soft tissue restrictions to attain remaining goals. Problem List: pain affecting function, decrease ROM, decrease strength, decrease ADL/ functional abilitiies, decrease activity tolerance and decrease flexibility/ joint mobility   Treatment Plan: Therapeutic exercise, Therapeutic activities, Neuromuscular re-education, Physical agent/modality, Manual therapy, Patient education, Self Care training and Functional mobility training    Updated Goals: Continue with unmet goals above. Frequency / Duration: Patient to be seen 2 times per week for 5 weeks:    G-Codes (GP)  Carry   Current  CL= 60-79%    Goal  CJ= 20-39%    The severity rating is based on clinical judgment and the FOTO score.       Mariella Mackenzie, PT 6/14/2018 2:00 PM

## 2018-06-14 NOTE — PROGRESS NOTES
PT DAILY TREATMENT NOTE - [facility]     Patient Name: Bhavya Albarran  Date:2018  : 1950  [x]  Patient  Verified  Payor: [place] / Plan: [place] MEDICARE PART A & B / Product Type: Medicare /    In time:8:58  Out time:10:03  Total Treatment Time (min): 65  Total Timed Codes (min): 65  1:1 Treatment Time ( [address] only): 40   Visit #: 6 of 16    Treatment Area: Left shoulder pain [M25.512]    SUBJECTIVE  Pain Level (0-10 scale): 0  Any medication changes, allergies to medications, adverse drug reactions, diagnosis change, or new procedure performed?: [x] No    [] Yes (see summary sheet for update)  Subjective functional status/changes:   [] No changes reported  Pt reports she is doing well with his exercises at home. OBJECTIVE        Min Type Additional Details    [] Estim:  []Unatt       []IFC  []Premod                        []Other:  []w/ice   []w/heat  Position:  Location:    [] Estim: []Att    []TENS instruct  []NMES                    []Other:  []w/US   []w/ice   []w/heat  Position:  Location:    []  Traction: [] Cervical       []Lumbar                       [] Prone          []Supine                       []Intermittent   []Continuous Lbs:  [] before manual  [] after manual    []  Ultrasound: []Continuous   [] Pulsed                           []1MHz   []3MHz W/cm2:  Location:    []  Iontophoresis with dexamethasone         Location: [] Take home patch   [] In clinic    []  Ice     []  heat  []  Ice massage  []  Laser   []  Anodyne Position:  Location:    []  Laser with stim  []  Other:  Position:  Location:    []  Vasopneumatic Device Pressure:       [] lo [] med [] hi   Temperature: [] lo [] med [] hi   [] Skin assessment post-treatment:  []intact []redness- no adverse reaction    []redness  adverse reaction:       55 min Therapeutic Exercise:  [x] See flow sheet :   Rationale: increase ROM and increase strength to improve the patients ability to increase tolerance to activities.            10 min Manual Therapy:  STJ mobs, TPR to left UT and LS, manual str in pain free protocol approved ranges flex and scap, ER to neutral at neutral   Rationale: decrease pain, increase ROM, increase tissue extensibility and decrease trigger points to increase ease with ADLs.                With   [] TE   [] TA   [] neuro   [] other: Patient Education: [x] Review HEP    [] Progressed/Changed HEP based on:   [] positioning   [] body mechanics   [] transfers   [] heat/ice application    [] other:      Other Objective/Functional Measures: see goals     Pain Level (0-10 scale) post treatment: 0    ASSESSMENT/Changes in Function: Pt has progressed well toward LTGs. Pt's PROM left shoulder flexion 130 deg, scaption 150 deg. Pt's AAROM with pulleys flexion and scaption 125 deg. Pt's AROM flexion 55 deg and scaption 50 deg. Pt provided with updated HEP to progress isometric strengthening at home. Patient will continue to benefit from skilled PT services to modify and progress therapeutic interventions, address functional mobility deficits, address ROM deficits, address strength deficits and analyze and address soft tissue restrictions to attain remaining goals. []  See Plan of Care  [x]  See progress note/recertification  []  See Discharge Summary         Progress towards goals / Updated goals:  Short Term Goals: To be accomplished in 3 weeks:  1. Pt will be independent and complaint w/ HEP to progress gains in PT. At eval: reviewed and progressed HEP  Current: Goal met: Pt reports performing HEP. 6/1/18  2. Pt will improve PROM to full flex and scap left shoulder for ease w/ sleeping. At eval: PROM left shoulder flex to 120 deg, scap to 90 deg  Current: Progressing, Flexion 130 degrees, Scaption 150 degrees, 6/12/2018  3. Pt will improve AAROM to > or = to 120 deg flex and scap as measured on pulleys for ease w/ dressing.    At eval: AAROM flex supine w/ wand = 140 deg, scap standing w/ wand 60 deg  Current: Goal met: AAROM flexion and scaption 125 deg with pulleys. 6/14/18  Long Term Goals: To be accomplished in 8 weeks:  1. Pt will improve FOTO to > or = to 63 to demo improved function. At eval: FOTO = 36  Current: Progressing: FOTO = 50, increased by 14 since [facility]. 6/14/18  2. Pt will improve AROM right shoulder flex and scap to > or = to 140 deg for ease w/ doing her hair. At eval: NT  Current: Progressing: AROM flexion 55 deg, scap 50 deg. 6/14/18  3. Pt will improve AROM right shoulder functional ER to > or = to occiput for ease putting on her shirt. At eval: NT  4. Pt will improve MMT right shoulder to > or = to -4-4/5 grossly for ease w/ return to unrestricted ADLs.    At eval: NT       PLAN  []  Upgrade activities as tolerated     [x]  Continue plan of care  []  Update interventions per flow sheet       []  Discharge due to:_  []  Other:_      Simon Epps PTA 6/14/2018  9:14 AM    Future Appointments  Date Time Provider Claire Johnson   6/19/2018 11:00 AM Abena Aponte PT [facility]   6/21/2018 8:30 AM Abena Aponte PT [facility]   6/26/2018 8:30 AM Simon Epps, PTA [facility]   7/3/2018 8:30 AM Abena Aponte PT [facility]   7/6/2018 8:30 AM Rico Mich, PTA [facility]   7/10/2018 8:30 AM Rico Mich, PTA [facility]   7/13/2018 8:30 AM Rico Mich, PTA [facility]   7/17/2018 8:30 AM Rico Mich, PTA [facility]   7/19/2018 9:30 AM Tia Nava PT [facility]   7/24/2018 8:30 AM Simon Epps, PTA [facility]   7/26/2018 8:30 AM Abena Aponte, TIMA [facility]

## 2018-06-19 ENCOUNTER — HOSPITAL ENCOUNTER (OUTPATIENT)
Dept: PHYSICAL THERAPY | Age: 68
Discharge: HOME | End: 2018-06-19
Payer: MEDICARE

## 2018-06-19 PROCEDURE — 97140 MANUAL THERAPY 1/> REGIONS: CPT

## 2018-06-19 PROCEDURE — 97112 NEUROMUSCULAR REEDUCATION: CPT

## 2018-06-19 NOTE — PROGRESS NOTES
PT DAILY TREATMENT NOTE - [facility]     Patient Name: Danielle Fox  Date:2018  : 1950  [x]  Patient  Verified  Payor: Heladio Fraction / Plan: [place] / Product Type: Medicare /    In time:1100  Out time:1140  Total Treatment Time (min): 40  Total Timed Codes (min): 40  1:1 Treatment Time ([facility] only): 30   Visit #: 7 of 16    Treatment Area: Left shoulder pain [M25.512]    SUBJECTIVE  Pain Level (0-10 scale): 0  Any medication changes, allergies to medications, adverse drug reactions, diagnosis change, or new procedure performed?: [x] No    [] Yes (see summary sheet for update)  Subjective functional status/changes:   [] No changes reported  Patient reports MD follow up with MD pleased with progression demonstrated    OBJECTIVE    10 min Therapeutic Exercise:  [x] See flow sheet : Emphasis placed on improving available shoulder AROM and strength of the rotator cuff muscles   Rationale: increase ROM and increase strength to improve the patients ability to improve ease with household ADLs      22 min Neuromuscular Re-education:  [x]  See flow sheet : Emphasis placed on improving activation and recruitment of the glenohumeral and scapulothoracic musculature and improving scapulohumeral mechanics with UE elevation   Rationale: increase ROM, increase strength and increase proprioception  to improve the patients ability to improve ease with return to exercise.      8 min Manual Therapy:    Supine, Left Scapular Medial Grade IV Mobilization with 70 deg Shoulder Scap  Supine, Left Shoulder Scaption Grade II-III  Physiological Mobilization  Supine, Left Shoulder Flexion Grade II-III Physiological Mobilization  Supine, Left Shoulder ER PROM (0 deg abd) - To 15deg  Supine, Left UE Multi-Directional Rhythmic Stabilization (90 deg flexion, scapular protraction - Applied at wrist)   Rationale: decrease pain, increase ROM and increase tissue extensibility to improve ease with overhead ADLs        With   [] TE   [] TA   [] neuro   [] other: Patient Education: [x] Review HEP    [] Progressed/Changed HEP based on:   [] positioning   [] body mechanics   [] transfers   [] heat/ice application    [] other:      Pain Level (0-10 scale) post treatment: 0    ASSESSMENT/Changes in Function: Initiated shoulder abduction AROM in sidelying with patient benefiting from manual cuing to correct scapulohumeral mechanics. Patient demonstrates improved scapulohumeral dissociation with shoulder PROM but continues to demonstrate poor scapulohumeral control with AROM with continued progression of scapulothoracic strengthening. Patient will continue to benefit from skilled PT services to modify and progress therapeutic interventions, address functional mobility deficits, address ROM deficits, address strength deficits, analyze and address soft tissue restrictions and analyze and cue movement patterns to attain remaining goals. []  See Plan of Care  []  See progress note/recertification  []  See Discharge Summary         Progress towards goals / Updated goals:    Short Term Goals: To be accomplished in 3 weeks:  1. Pt will be independent and complaint w/ HEP to progress gains in PT. At PN: Goal met: Pt reports performing HEP. 6/1/18  2. Pt will improve PROM to full flex and scap left shoulder for ease w/ sleeping. At PN: Progressing, Flexion 130 degrees, Scaption 150 degrees, 6/12/2018  3. Pt will improve AAROM to > or = to 120 deg flex and scap as measured on pulleys for ease w/ dressing. At PN: Goal met: AAROM flexion and scaption 125 deg with pulleys. 6/14/18  Long Term Goals: To be accomplished in 8 weeks:  1. Pt will improve FOTO to > or = to 63 to demo improved function. At PN: Progressing: FOTO = 50, increased by 14 since [facility]. 6/14/18  2. Pt will improve AROM right shoulder flex and scap to > or = to 140 deg for ease w/ doing her hair. At PN: Progressing: AROM flexion 55 deg, scap 50 deg. 6/14/18  3.  Pt will improve AROM right shoulder functional ER to > or = to occiput for ease putting on her shirt. At eval: NT  4. Pt will improve MMT right shoulder to > or = to -4-4/5 grossly for ease w/ return to unrestricted ADLs.    At eval: NT    PLAN  [x]  Upgrade activities as tolerated     [x]  Continue plan of care  []  Update interventions per flow sheet       []  Discharge due to:_  []  Other:_      Austin Macias PT 6/19/2018  8:24 AM    Future Appointments  Date Time Provider Claire Johnson   6/19/2018 11:00 AM Austin Macias, PT [facility]   6/21/2018 8:30 AM Austin Macias, PT [facility]   6/26/2018 8:30 AM Jasmina Blotter, PTA [facility]   7/3/2018 8:30 AM Austin Macias, PT [facility]   7/6/2018 8:30 AM Jasmina Blotter, PTA [facility]   7/10/2018 8:30 AM Jasmina Blotter, PTA [facility]   7/13/2018 8:30 AM Jasmina Blotter, PTA [facility]   7/17/2018 8:30 AM Jasmina Blotter, PTA [facility]   7/19/2018 9:30 AM Joyce Rodarte, PT [facility]   7/24/2018 8:30 AM Jasmina Blotter, PTA [facility]   7/26/2018 8:30 AM Austin Macias, PT [facility]

## 2018-06-21 ENCOUNTER — HOSPITAL ENCOUNTER (OUTPATIENT)
Dept: PHYSICAL THERAPY | Age: 68
Discharge: HOME | End: 2018-06-21
Payer: MEDICARE

## 2018-06-21 PROCEDURE — 97110 THERAPEUTIC EXERCISES: CPT

## 2018-06-21 PROCEDURE — 97140 MANUAL THERAPY 1/> REGIONS: CPT

## 2018-06-21 PROCEDURE — 97112 NEUROMUSCULAR REEDUCATION: CPT
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
## 2018-06-26 ENCOUNTER — HOSPITAL ENCOUNTER (OUTPATIENT)
Dept: PHYSICAL THERAPY | Age: 68
Discharge: HOME | End: 2018-06-26
Payer: MEDICARE

## 2018-06-26 PROCEDURE — 97110 THERAPEUTIC EXERCISES: CPT

## 2018-06-26 PROCEDURE — 97140 MANUAL THERAPY 1/> REGIONS: CPT

## 2018-06-26 PROCEDURE — 97112 NEUROMUSCULAR REEDUCATION: CPT

## 2018-06-26 NOTE — PROGRESS NOTES
PT DAILY TREATMENT NOTE - [facility]     Patient Name: Regino Perdomo  Date:2018  : 1950  [x]  Patient  Verified  Payor: Dominguez Rodriguez / Plan: [place] MEDICARE PART A & B / Product Type: Medicare /    In time:8:32  Out time:9:23  Total Treatment Time (min): 51  Total Timed Codes (min): 51  1:1 Treatment Time ( [address] only): 40   Visit #: 9 of 16    Treatment Area: Left shoulder pain [M25.512]    SUBJECTIVE  Pain Level (0-10 scale): 0  Any medication changes, allergies to medications, adverse drug reactions, diagnosis change, or new procedure performed?: [x] No    [] Yes (see summary sheet for update)  Subjective functional status/changes:   [] No changes reported  Pt reports her arm is feeling well today. OBJECTIVE        Min Type Additional Details    [] Estim:  []Unatt       []IFC  []Premod                        []Other:  []w/ice   []w/heat  Position:  Location:    [] Estim: []Att    []TENS instruct  []NMES                    []Other:  []w/US   []w/ice   []w/heat  Position:  Location:    []  Traction: [] Cervical       []Lumbar                       [] Prone          []Supine                       []Intermittent   []Continuous Lbs:  [] before manual  [] after manual    []  Ultrasound: []Continuous   [] Pulsed                           []1MHz   []3MHz W/cm2:  Location:    []  Iontophoresis with dexamethasone         Location: [] Take home patch   [] In clinic    []  Ice     []  heat  []  Ice massage  []  Laser   []  Anodyne Position:  Location:    []  Laser with stim  []  Other:  Position:  Location:    []  Vasopneumatic Device Pressure:       [] lo [] med [] hi   Temperature: [] lo [] med [] hi   [] Skin assessment post-treatment:  []intact []redness- no adverse reaction    []redness  adverse reaction:     26 min Therapeutic Exercise:  [x] See flow sheet :   Rationale: increase ROM and increase strength to improve the patients ability to increase tolerance to activities.         15 min Neuromuscular Re-education:  [x]  See flow sheet :scapular stabilization. Rationale: increase ROM and increase strength  to improve the patients ability to increase ease with lifting activities.     10 min Manual Therapy:  STJ mobs, TPR to left UT and LS, manual str in pain free protocol approved ranges flex and scap, ER, sub scap release. Rationale: decrease pain, increase ROM, increase tissue extensibility and decrease trigger points to increase ease with ADLs. With   [] TE   [] TA   [] neuro   [] other: Patient Education: [x] Review HEP    [] Progressed/Changed HEP based on:   [] positioning   [] body mechanics   [] transfers   [] heat/ice application    [] other:      Other Objective/Functional Measures: AROM flexion 55 deg, scap 45 deg. Pain Level (0-10 scale) post treatment: 0    ASSESSMENT/Changes in Function: Pt continues to be tight along subscap with causing scapular to lift with supine flexion. Pt continues to be challenged with AROM. Patient will continue to benefit from skilled PT services to modify and progress therapeutic interventions, address functional mobility deficits, address ROM deficits, address strength deficits and analyze and address soft tissue restrictions to attain remaining goals. []  See Plan of Care  []  See progress note/recertification  []  See Discharge Summary         Progress towards goals / Updated goals:  Short Term Goals: To be accomplished in 3 weeks:  1. Pt will be independent and complaint w/ HEP to progress gains in PT. At PN: Goal met: Pt reports performing HEP. 6/1/18  2. Pt will improve PROM to full flex and scap left shoulder for ease w/ sleeping. At PN: Progressing, Flexion 130 degrees, Scaption 150 degrees, 6/12/2018  3. Pt will improve AAROM to > or = to 120 deg flex and scap as measured on pulleys for ease w/ dressing. At PN: Goal met: AAROM flexion and scaption 125 deg with pulleys. 6/14/18  Long Term Goals: To be accomplished in 8 weeks:  1. Pt will improve FOTO to > or = to 63 to demo improved function. At PN: Progressing: FOTO = 50, increased by 14 since [facility]. 6/14/18  2. 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 At PN: Progressing: AROM flexion 55 deg, scap 50 deg. 6/14/18  Current: Regressed: AROM flexion 55 deg, scap 45 deg. 6/26/18  3. Pt will improve AROM right shoulder functional ER to > or = to occiput for ease putting on her shirt. At eval: NT  Current: Progressing, Lef Shoulder Functional ER = Left Ear, 6/21/2018  4. Pt will improve MMT left shoulder to > or = to -4-4/5 grossly for ease w/ return to unrestricted ADLs.    At eval: NT       PLAN  []  Upgrade activities as tolerated     [x]  Continue plan of care  []  Update interventions per flow sheet       []  Discharge due to:_  []  Other:_      Rhoda Janice, PTA 6/26/2018  8:48 AM    Future Appointments  Date Time Provider Claire Johnson   7/3/2018 8:30 AM Melquiades Owens, PT [facility]   7/6/2018 8:30 AM Rhoda Janice, PTA [facility]   7/10/2018 8:30 AM Rhoda Janice, PTA [facility]   7/13/2018 8:30 AM Rhoda Janice, PTA [facility]   7/17/2018 8:30 AM Rhoda Janice, PTA [facility]   7/19/2018 9:30 AM Shilpa Solares PT [facility]   7/24/2018 8:30 AM Rhoda Janice, PTA [facility]   7/26/2018 8:30 AM Melquiades Owens PT [facility]

## 2018-06-28 ENCOUNTER — HOSPITAL ENCOUNTER (OUTPATIENT)
Dept: PHYSICAL THERAPY | Age: 68
Discharge: HOME | End: 2018-06-28
Payer: MEDICARE

## 2018-06-28 PROCEDURE — 97112 NEUROMUSCULAR REEDUCATION: CPT

## 2018-06-28 PROCEDURE — 97110 THERAPEUTIC EXERCISES: CPT

## 2018-06-28 PROCEDURE — 97140 MANUAL THERAPY 1/> REGIONS: CPT

## 2018-06-28 NOTE — PROGRESS NOTES
PT DAILY TREATMENT NOTE - [facility]     Patient Name: Candelaria Angeles  Date:2018  : 1950  [x]  Patient  Verified  Payor: Mesha Burnett / Plan: [place] MEDICARE PART A & B / Product Type: Medicare /    In time:1058  Out time:1144  Total Treatment Time (min): 46  Total Timed Codes (min): 46  1:1 Treatment Time ( [address] only): 55   Visit #: 10  16    Treatment Area: Left shoulder pain [M25.512]    SUBJECTIVE  Pain Level (0-10 scale): 0  Any medication changes, allergies to medications, adverse drug reactions, diagnosis change, or new procedure performed?: [x] No    [] Yes (see summary sheet for update)  Subjective functional status/changes:   [] No changes reported  Patient reports continued increased functional use of the left shoulder.     OBJECTIVE    10 min Therapeutic Exercise:  [x] See flow sheet : Emphasis placed on improving available shoulder AROM and strength of the rotator cuff muscles   Rationale: increase ROM and increase strength to improve the patients ability to improve ease with household ADLs      28 min Neuromuscular Re-education:  [x]  See flow sheet : Emphasis placed on improving activation and recruitment of the glenohumeral and scapulothoracic musculature and improving scapulohumeral mechanics with UE elevation   Rationale: increase ROM, increase strength and increase proprioception  to improve the patients ability to improve ease with return to exercise.      8 min Manual Therapy:    Supine, Left Scapular Medial Grade IV Mobilization with 70 deg Shoulder Scap  Supine, Left Shoulder Scaption Grade II-III  Physiological Mobilization  Supine, Left Shoulder Flexion Grade II-III Physiological Mobilization  Supine, Left UE Multi-Directional Rhythmic Stabilization (90 deg flexion, scapular protraction - Applied at wrist)  Supine, Left UE Multi-Directional Rhythmic Stabilization (120 deg flexion, scapular protraction - Applied at wrist)  Supine, Left UE Multi-Directional Rhythmic Stabilization (90 deg abduction, scapular protraction - Applied at wrist)   Rationale: decrease pain, increase ROM and increase tissue extensibility to improve ease with overhead ADLs                                                       With   [] TE   [] TA   [] neuro   [] other: Patient Education: [x] Review HEP    [] Progressed/Changed HEP based on:   [] positioning   [] body mechanics   [] transfers   [] heat/ice application    [] other:      Pain Level (0-10 scale) post treatment: 0    ASSESSMENT/Changes in Function: Initiation of standing cane shoulder AAROM with inability to achieve >90 degrees with poor scapulohumeral rhythm. Patient demonstrates improved strength of shoulder elevators as demonstrated by ability to perform increased repetitions with wall slides. Patient will continue to benefit from skilled PT services to modify and progress therapeutic interventions, address functional mobility deficits, address ROM deficits, address strength deficits, analyze and address soft tissue restrictions, analyze and cue movement patterns, analyze and modify body mechanics/ergonomics and assess and modify postural abnormalities to attain remaining goals. []  See Plan of Care  []  See progress note/recertification  []  See Discharge Summary         Progress towards goals / Updated goals:    Short Term Goals: To be accomplished in 3 weeks:  1. Pt will be independent and complaint w/ HEP to progress gains in PT. At PN: Goal met: Pt reports performing HEP. 6/1/18  2. Pt will improve PROM to full flex and scap left shoulder for ease w/ sleeping. At PN: Progressing, Flexion 130 degrees, Scaption 150 degrees, 6/12/2018  Current: Progressing, Flexion 155 degrees, Scpation 161 degrees, 6/28/2018  3. Pt will improve AAROM to > or = to 120 deg flex and scap as measured on pulleys for ease w/ dressing. At PN: Goal met: AAROM flexion and scaption 125 deg with pulleys. 6/14/18  Long Term Goals: To be accomplished in 8 weeks:  1.  Pt will improve FOTO to > or = to 63 to demo improved function. At PN: Progressing: FOTO = 50, increased by 14 since [facility]. 6/14/18  2. 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 At PN: Progressing: AROM flexion 55 deg, scap 50 deg. 6/14/18  Current: Regressed: AROM flexion 55 deg, scap 45 deg. 6/26/18  3. Pt will improve AROM right shoulder functional ER to > or = to occiput for ease putting on her shirt. At eval: NT  Current: Progressing, Lef Shoulder Functional ER = Left Ear, 6/21/2018  4. Pt will improve MMT left shoulder to > or = to -4-4/5 grossly for ease w/ return to unrestricted ADLs.      PLAN  [x]  Upgrade activities as tolerated     [x]  Continue plan of care  []  Update interventions per flow sheet       []  Discharge due to:_  []  Other:_      Dawson Bennett PT 6/28/2018  7:52 AM    Future Appointments  Date Time Provider Claire Johnson   6/28/2018 11:00 AM Dawson Bennett, PT [facility]   7/3/2018 8:30 AM Dawson Bennett, PT [facility]   7/6/2018 8:30 AM Yue Angeli, PTA [facility]   7/10/2018 8:30 AM Venicerishannon Suh, PTA [facility]   7/13/2018 8:30 AM Florrie Angeli, PTA [facility]   7/17/2018 8:30 AM Venicerie Angeli, PTA [facility]   7/19/2018 9:30 AM Ankit Giraldo PT [facility]   7/24/2018 8:30 AM Mara Suh, PTA [facility]   7/26/2018 8:30 AM Dawson Bennett PT [facility]

## 2018-06-29 ENCOUNTER — APPOINTMENT (OUTPATIENT)
Dept: PHYSICAL THERAPY | Age: 68
End: 2018-06-29
Payer: MEDICARE

## 2018-07-03 ENCOUNTER — HOSPITAL ENCOUNTER (OUTPATIENT)
Dept: PHYSICAL THERAPY | Age: 68
Discharge: HOME | End: 2018-07-03
Payer: MEDICARE

## 2018-07-03 PROCEDURE — 97112 NEUROMUSCULAR REEDUCATION: CPT

## 2018-07-03 PROCEDURE — 97140 MANUAL THERAPY 1/> REGIONS: CPT
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
## 2018-07-06 ENCOUNTER — HOSPITAL ENCOUNTER (OUTPATIENT)
Dept: PHYSICAL THERAPY | Age: 68
Discharge: HOME | End: 2018-07-06
Payer: MEDICARE

## 2018-07-06 PROCEDURE — 97110 THERAPEUTIC EXERCISES: CPT

## 2018-07-06 PROCEDURE — 97140 MANUAL THERAPY 1/> REGIONS: CPT

## 2018-07-06 PROCEDURE — 97112 NEUROMUSCULAR REEDUCATION: CPT

## 2018-07-06 NOTE — PROGRESS NOTES
PT DAILY TREATMENT NOTE - [facility]     Patient Name: Rosa Hoover  Date:2018  : 1950  [x]  Patient  Verified  Payor: [place] MEDICARE / Plan: [place] MEDICARE PART A & B / Product Type: Medicare /    In time:8:32  Out time:9:23  Total Treatment Time (min): 51  Total Timed Codes (min): 51  1:1 Treatment Time ( [address] only): 40   Visit #: 11 of 16    Treatment Area: Left shoulder pain [M25.512]    SUBJECTIVE  Pain Level (0-10 scale): 0  Any medication changes, allergies to medications, adverse drug reactions, diagnosis change, or new procedure performed?: [x] No    [] Yes (see summary sheet for update)  Subjective functional status/changes:   [] No changes reported  Pt reports that her arm swelled along on Wednesday, but she thinks it was from her pushing herself with her home exercises too much.      OBJECTIVE        Min Type Additional Details    [] Estim:  []Unatt       []IFC  []Premod                        []Other:  []w/ice   []w/heat  Position:  Location:    [] Estim: []Att    []TENS instruct  []NMES                    []Other:  []w/US   []w/ice   []w/heat  Position:  Location:    []  Traction: [] Cervical       []Lumbar                       [] Prone          []Supine                       []Intermittent   []Continuous Lbs:  [] before manual  [] after manual    []  Ultrasound: []Continuous   [] Pulsed                           []1MHz   []3MHz W/cm2:  Location:    []  Iontophoresis with dexamethasone         Location: [] Take home patch   [] In clinic    []  Ice     []  heat  []  Ice massage  []  Laser   []  Anodyne Position:  Location:    []  Laser with stim  []  Other:  Position:  Location:    []  Vasopneumatic Device Pressure:       [] lo [] med [] hi   Temperature: [] lo [] med [] hi   [] Skin assessment post-treatment:  []intact []redness- no adverse reaction    []redness  adverse reaction:     26 min Therapeutic Exercise:  [x] See flow sheet :   Rationale: increase ROM and increase strength to improve the patients ability to increase tolerance to activities.        15 min Neuromuscular Re-education:  [x]  See flow sheet :scapular stabilization. Rationale: increase ROM and increase strength  to improve the patients ability to increase ease with lifting activities.     10 min Manual Therapy:  STJ mobs, TPR to left UT and LS, manual str in pain free protocol approved ranges flex and scap, ER, sub scap release. Rationale: decrease pain, increase ROM, increase tissue extensibility and decrease trigger points to increase ease with ADLs. With   [] TE   [] TA   [] neuro   [] other: Patient Education: [x] Review HEP    [] Progressed/Changed HEP based on:   [] positioning   [] body mechanics   [] transfers   [] heat/ice application    [] other:      Other Objective/Functional Measures: FOTO = 50, increased by 14 since [facility]. Pain Level (0-10 scale) post treatment: 0    ASSESSMENT/Changes in Function: Continue to progress pt as tolerated. Patient will continue to benefit from skilled PT services to modify and progress therapeutic interventions, address functional mobility deficits, address ROM deficits, address strength deficits and analyze and address soft tissue restrictions to attain remaining goals. []  See Plan of Care  []  See progress note/recertification  []  See Discharge Summary         Progress towards goals / Updated goals:  Short Term Goals: To be accomplished in 3 weeks:  1. Pt will be independent and complaint w/ HEP to progress gains in PT. At PN: Goal met: Pt reports performing HEP. 6/1/18  2. Pt will improve PROM to full flex and scap left shoulder for ease w/ sleeping. At PN: Progressing, Flexion 130 degrees, Scaption 150 degrees, 6/12/2018  Current: Progressing, Flexion 155 degrees, Scpation 161 degrees, 6/28/2018  3.  Pt will improve AAROM to > or = to 120 deg flex and scap as measured on pulleys for ease w/ dressing. At PN: Goal met: AAROM flexion and scaption 125 deg with pulleys. 6/14/18  Long Term Goals: To be accomplished in 8 weeks:  1. Pt will improve FOTO to > or = to 63 to demo improved function. At PN: Progressing: FOTO = 50, increased by 14 since [facility]. 6/14/18  Current: Remains: FOTO = 50, increased by 14 since [facility]. 7/6/18  2. 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 At PN: Progressing: AROM flexion 55 deg, scap 50 deg. 6/14/18  Current: Regressed: AROM flexion 55 deg, scap 45 deg. 6/26/18  3. Pt will improve AROM right shoulder functional ER to > or = to occiput for ease putting on her shirt. At eval: NT  Current: Progressing, Left Shoulder Functional ER = Left Ear, 7/3/2018  4. Pt will improve MMT left shoulder to > or = to -4-4/5 grossly for ease w/ return to unrestricted ADLs.    At Eval: NT       PLAN  []  Upgrade activities as tolerated     [x]  Continue plan of care  []  Update interventions per flow sheet       []  Discharge due to:_  []  Other:_      Adilia Martinez PTA 7/6/2018  8:34 AM    Future Appointments  Date Time Provider Claire Johnson   7/10/2018 8:30 AM Adilia Martinez PTA [facility]   7/13/2018 8:30 AM Adilia Martinez PTA [facility]   7/17/2018 8:30 AM Adilia Martinez PTA [facility]   7/19/2018 9:30 AM Fredo Weldon, PT [facility]   7/24/2018 8:30 AM Adilia Martinez, PTA [facility]   7/26/2018 8:30 AM Magdalene Brambila, PT [facility]

## 2018-07-10 ENCOUNTER — HOSPITAL ENCOUNTER (OUTPATIENT)
Dept: PHYSICAL THERAPY | Age: 68
Discharge: HOME | End: 2018-07-10
Payer: MEDICARE

## 2018-07-10 PROCEDURE — 97112 NEUROMUSCULAR REEDUCATION: CPT

## 2018-07-10 PROCEDURE — 97110 THERAPEUTIC EXERCISES: CPT

## 2018-07-10 PROCEDURE — 97140 MANUAL THERAPY 1/> REGIONS: CPT

## 2018-07-10 NOTE — PROGRESS NOTES
PT DAILY TREATMENT NOTE - [facility]     Patient Name: Navdeep Chou  Date:7/10/2018  : 1950  [x]  Patient  Verified  Payor: [place] MEDICARE / Plan: [place] MEDICARE PART A & B / Product Type: Medicare /    In time:8:30  Out time:9:16  Total Treatment Time (min): 46  Total Timed Codes (min): 46  1:1 Treatment Time ( [address] only): 40   Visit #:12 of 16    Treatment Area: Left shoulder pain [M25.512]    SUBJECTIVE  Pain Level (0-10 scale): 0  Any medication changes, allergies to medications, adverse drug reactions, diagnosis change, or new procedure performed?: [x] No    [] Yes (see summary sheet for update)  Subjective functional status/changes:   [] No changes reported  Pt reports she is sore today from doing her home exercises yesterday. Pt reports that reaching into cabinets are getting easier.      OBJECTIVE        Min Type Additional Details    [] Estim:  []Unatt       []IFC  []Premod                        []Other:  []w/ice   []w/heat  Position:  Location:    [] Estim: []Att    []TENS instruct  []NMES                    []Other:  []w/US   []w/ice   []w/heat  Position:  Location:    []  Traction: [] Cervical       []Lumbar                       [] Prone          []Supine                       []Intermittent   []Continuous Lbs:  [] before manual  [] after manual    []  Ultrasound: []Continuous   [] Pulsed                           []1MHz   []3MHz W/cm2:  Location:    []  Iontophoresis with dexamethasone         Location: [] Take home patch   [] In clinic    []  Ice     []  heat  []  Ice massage  []  Laser   []  Anodyne Position:  Location:    []  Laser with stim  []  Other:  Position:  Location:    []  Vasopneumatic Device Pressure:       [] lo [] med [] hi   Temperature: [] lo [] med [] hi   [] Skin assessment post-treatment:  []intact []redness- no adverse reaction    []redness  adverse reaction:     21 min Therapeutic Exercise:  [x] See flow sheet :   Rationale: increase ROM and increase strength to improve the patients ability to increase tolerance to activities.         15 min Neuromuscular Re-education:  [x]  See flow sheet :scapular stabilization. Rationale: increase ROM and increase strength  to improve the patients ability to increase ease with lifting activities.     10 min Manual Therapy:  STJ mobs, TPR to left UT and LS, manual str in pain free protocol approved ranges flex and scap, ER, sub scap release. Rationale: decrease pain, increase ROM, increase tissue extensibility and decrease trigger points to increase ease with ADLs.                                                                                         With   [] TE   [] TA   [] neuro   [] other: Patient Education: [x] Review HEP    [] Progressed/Changed HEP based on:   [] positioning   [] body mechanics   [] transfers   [] heat/ice application    [] other:      Other Objective/Functional Measures: AROM flexion 63 deg, scap 54 deg. Pain Level (0-10 scale) post treatment: 0    ASSESSMENT/Changes in Function: Pt continues to be limited with AROM and using UT for substitution. Patient will continue to benefit from skilled PT services to modify and progress therapeutic interventions, address functional mobility deficits, address ROM deficits, address strength deficits and analyze and address soft tissue restrictions to attain remaining goals. []  See Plan of Care  []  See progress note/recertification  []  See Discharge Summary         Progress towards goals / Updated goals:  Short Term Goals: To be accomplished in 3 weeks:  1. Pt will be independent and complaint w/ HEP to progress gains in PT. At PN: Goal met: Pt reports performing HEP. 6/1/18  2. Pt will improve PROM to full flex and scap left shoulder for ease w/ sleeping. At PN: Progressing, Flexion 130 degrees, Scaption 150 degrees, 6/12/2018  Current: Progressing, Flexion 155 degrees, Scpation 161 degrees, 6/28/2018  3.  Pt will improve AAROM to > or = to 120 deg flex and scap as measured on pulleys for ease w/ dressing. At PN: Goal met: AAROM flexion and scaption 125 deg with pulleys. 6/14/18  Long Term Goals: To be accomplished in 8 weeks:  1. Pt will improve FOTO to > or = to 63 to demo improved function. At PN: Progressing: FOTO = 50, increased by 14 since [facility]. 6/14/18  Current: Remains: FOTO = 50, increased by 14 since [facility]. 7/6/18  2. 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 At PN: Progressing: AROM flexion 55 deg, scap 50 deg. 6/14/18  Current: Progressing: AROM flexion 63 deg, scap 54 deg. 7/10/18  3. Pt will improve AROM right shoulder functional ER to > or = to occiput for ease putting on her shirt. At eval: NT  Current: Progressing, Left Shoulder Functional ER = Left Ear, 7/3/2018  4. Pt will improve MMT left shoulder to > or = to -4-4/5 grossly for ease w/ return to unrestricted ADLs.    At Eval: NT       PLAN  []  Upgrade activities as tolerated     [x]  Continue plan of care  []  Update interventions per flow sheet       []  Discharge due to:_  []  Other:_      Norville Cowden, PTA 7/10/2018  8:57 AM    Future Appointments  Date Time Provider Claire Johnson   7/13/2018 8:30 AM Norville Cowden, PTA [facility]   7/17/2018 8:30 AM Norville Cowden, PTA [facility]   7/19/2018 9:30 AM Daniel Amanda, PT [facility]   7/24/2018 8:30 AM Norville Cowden, PTA [facility]   7/26/2018 8:30 AM Eusebio Willson, PT [facility]

## 2018-07-13 ENCOUNTER — HOSPITAL ENCOUNTER (OUTPATIENT)
Dept: PHYSICAL THERAPY | Age: 68
Discharge: HOME | End: 2018-07-13
Payer: MEDICARE

## 2018-07-13 PROCEDURE — G8985 CARRY GOAL STATUS: HCPCS

## 2018-07-13 PROCEDURE — 97140 MANUAL THERAPY 1/> REGIONS: CPT

## 2018-07-13 PROCEDURE — G8984 CARRY CURRENT STATUS: HCPCS

## 2018-07-13 PROCEDURE — 97112 NEUROMUSCULAR REEDUCATION: CPT

## 2018-07-13 PROCEDURE — 97110 THERAPEUTIC EXERCISES: CPT

## 2018-07-13 NOTE — PROGRESS NOTES
[facility]              [address] [place], [address] [place]   [phone] fax    Medicare Progress Report    Patient name: Dell Hurd Start of Care: 2018   Referral source: Radha Heath, * : 1950   Medical/Treatment Diagnosis: Left shoulder pain [M25.512] Onset Date:2018     Prior Hospitalization: see medical history Provider#: [id number]   Medications: Verified on Patient Summary List    Comorbidities: arthritis, back pain, GI disease, HBP, sleep dysfunction    Prior Level of Function: Hx of left shoulder pain, right hand dominant, (I) w/ ADLs and MADLs, retired   Visits from [place] of Care: 14    Missed Visits: 1    Reporting Period: 2018 to 2018    Subjective Reports:     Short Term Goals: To be accomplished in 3 weeks:  1. Pt will be independent and complaint w/ HEP to progress gains in PT. At Last PN: Goal met: Pt reports performing HEP  2. Pt will improve PROM to full flex and scap left shoulder for ease w/ sleeping. At Last PN: Progressing, Flexion 130 degrees, Scaption 150 degrees  At PN: Goal met, Flexion 155 degrees, Scpation 161 degrees  3. Pt will improve AAROM to > or = to 120 deg flex and scap as measured on pulleys for ease w/ dressing. At Last PN: Goal met: AAROM flexion and scaption 125 deg with pulleys  Long Term Goals: To be accomplished in 8 weeks:  1. Pt will improve FOTO to > or = to 63 to demo improved function. At Last PN: Progressing: FOTO = 50, increased by 14 since [facility]  At PN: Remains: FOTO = 50, increased by 14 since [facility]  2. 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 At Last PN: Progressing: AROM flexion 55 deg, scap 50 deg  At PN: Progressing: AROM flexion 63 deg, scap 54 deg  3. Pt will improve AROM right shoulder functional ER to > or = to occiput for ease putting on her shirt.    At eval: NT  At PN: Progressing, Left Shoulder Functional ER = Left Ear  4. Pt will improve MMT left shoulder to > or = to -4-4/5 grossly for ease w/ return to unrestricted ADLs. At Eval: NT  At PN: Not met: flexion 3-/5, abd 3-/5, IR 3/5, ER 4/5    Key functional changes: See above goals. Problems/ barriers to goal attainment: None     Assessment / Recommendations:    Patient has progressed well toward LTGs with patient demonstrating improved functional utilization of the left UE. Pt's PROM of left shoulder is WNL with patient continuing to remain limited with AROM and strength.       Patient will continue to benefit from skilled PT services to modify and progress therapeutic interventions, address functional mobility deficits, address ROM deficits, address strength deficits and analyze and address soft tissue restrictions to attain remaining goals.      Problem List: pain affecting function, decrease ROM, decrease strength, decrease ADL/ functional abilitiies, decrease activity tolerance and decrease flexibility/ joint mobility   Treatment Plan: Therapeutic exercise, Therapeutic activities, Neuromuscular re-education, Physical agent/modality, Manual therapy, Patient education, Self Care training, Functional mobility training and Home safety training    Updated Goals: Continue with unmet goals above. Frequency / Duration: Patient to be seen 2 times per week for 6 weeks:    G-Codes (GP)  Carry   Current  CK= 40-59%    Goal  CJ= 20-39%    The severity rating is based on clinical judgment and the FOTO score.       Ian Olvera, PT 7/13/2018 12:56 PM

## 2018-07-17 ENCOUNTER — HOSPITAL ENCOUNTER (OUTPATIENT)
Dept: PHYSICAL THERAPY | Age: 68
Discharge: HOME | End: 2018-07-17
Payer: MEDICARE

## 2018-07-17 PROCEDURE — 97112 NEUROMUSCULAR REEDUCATION: CPT

## 2018-07-17 PROCEDURE — 97110 THERAPEUTIC EXERCISES: CPT

## 2018-07-17 NOTE — PROGRESS NOTES
PT DAILY TREATMENT NOTE - [facility]     Patient Name: Delvis Alvarez  Date:2018  : 1950  [x]  Patient  Verified  Payor: [place] MEDICARE / Plan: [place] MEDICARE PART A & B / Product Type: Medicare /    In time:8:30  Out time:9:23  Total Treatment Time (min): 53  Total Timed Codes (min): 53  1:1 Treatment Time ( [address] only): 48   Visit #: 15 of16    Treatment Area: Left shoulder pain [M25.512]    SUBJECTIVE  Pain Level (0-10 scale): 0  Any medication changes, allergies to medications, adverse drug reactions, diagnosis change, or new procedure performed?: [x] No    [] Yes (see summary sheet for update)  Subjective functional status/changes:   [] No changes reported  Pt reports she felt tired after last session. OBJECTIVE        Min Type Additional Details    [] Estim:  []Unatt       []IFC  []Premod                        []Other:  []w/ice   []w/heat  Position:  Location:    [] Estim: []Att    []TENS instruct  []NMES                    []Other:  []w/US   []w/ice   []w/heat  Position:  Location:    []  Traction: [] Cervical       []Lumbar                       [] Prone          []Supine                       []Intermittent   []Continuous Lbs:  [] before manual  [] after manual    []  Ultrasound: []Continuous   [] Pulsed                           []1MHz   []3MHz W/cm2:  Location:    []  Iontophoresis with dexamethasone         Location: [] Take home patch   [] In clinic    []  Ice     []  heat  []  Ice massage  []  Laser   []  Anodyne Position:  Location:    []  Laser with stim  []  Other:  Position:  Location:    []  Vasopneumatic Device Pressure:       [] lo [] med [] hi   Temperature: [] lo [] med [] hi   [] Skin assessment post-treatment:  []intact []redness- no adverse reaction    []redness  adverse reaction:           28 min Therapeutic Exercise:  [x] See flow sheet :   Rationale: increase ROM and increase strength to improve the patients ability to increase tolerance to activities.            25 min Neuromuscular Re-education:  [x]  See flow sheet :scapular stabilization. Rationale: increase ROM and increase strength  to improve the patients ability to increase ease with lifting activities.              With   [] TE   [] TA   [] neuro   [] other: Patient Education: [x] Review HEP    [] Progressed/Changed HEP based on:   [] positioning   [] body mechanics   [] transfers   [] heat/ice application    [] other:      Other Objective/Functional Measures:      Pain Level (0-10 scale) post treatment: 0    ASSESSMENT/Changes in Function: Held on manual due to pt's PROM is WNL without limitations. Pt continues to remain limited with AROM, progressed pt to increase strength. Patient will continue to benefit from skilled PT services to modify and progress therapeutic interventions, address functional mobility deficits, address ROM deficits, address strength deficits and analyze and address soft tissue restrictions to attain remaining goals. []  See Plan of Care  []  See progress note/recertification  []  See Discharge Summary         Progress towards goals / Updated goals:  Short Term Goals: To be accomplished in 3 weeks:  1. Pt will be independent and complaint w/ HEP to progress gains in PT. At PN: Goal met: Pt reports performing HEP. 6/1/18  2. Pt will improve PROM to full flex and scap left shoulder for ease w/ sleeping. At PN:  Goal met, Flexion 155 degrees, Scpation 161 degrees, 7/13/2018  3. Pt will improve AAROM to > or = to 120 deg flex and scap as measured on pulleys for ease w/ dressing. At PN: Goal met: AAROM flexion and scaption 125 deg with pulleys. 6/14/18  Long Term Goals: To be accomplished in 8 weeks:  1. Pt will improve FOTO to > or = to 63 to demo improved function. At PN:  FOTO = 50, increased by 14 since [facility]. 7/6/18  2. 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 At PN: AROM flexion 63 deg, scap 54 deg. 7/10/18  3.  Pt will improve AROM right shoulder functional ER to > or = to occiput for ease putting on her shirt. At eval:Left Shoulder Functional ER = Left Ear, 7/13/2018  4. Pt will improve MMT left shoulder to > or = to -4-4/5 grossly for ease w/ return to unrestricted ADLs.    At Eval:  flexion 3-/5, abd 3-/5, IR 3/5, ER 4/5 7/13/18       PLAN  []  Upgrade activities as tolerated     [x]  Continue plan of care  []  Update interventions per flow sheet       []  Discharge due to:_  []  Other:_      Adilia Martinez PTA 7/17/2018  8:48 AM    Future Appointments  Date Time Provider Claire Johnson   7/19/2018 9:30 AM Fredo Weldon, PT [facility]   7/24/2018 8:30 AM Adilia Martinez PTA [facility]   7/26/2018 8:30 AM Magdalene Brambila PT [facility]

## 2018-07-19 ENCOUNTER — HOSPITAL ENCOUNTER (OUTPATIENT)
Dept: PHYSICAL THERAPY | Age: 68
Discharge: HOME | End: 2018-07-19
Payer: MEDICARE

## 2018-07-19 PROCEDURE — 97112 NEUROMUSCULAR REEDUCATION: CPT | Performed by: PHYSICAL THERAPIST

## 2018-07-19 NOTE — PROGRESS NOTES
PT DAILY TREATMENT NOTE - [facility]     Patient Name: Navdeep Chou  Date:2018  : 1950  [x]  Patient  Verified  Payor: [place] MEDICARE / Plan: [place] MEDICARE PART A & B / Product Type: Medicare /    In time:930  Out time:1035  Total Treatment Time (min): 65  Total Timed Codes (min): 65  1:1 Treatment Time ( [address] only): 23   Visit #: 14 of 16    Treatment Area: Left shoulder pain [M25.512]    SUBJECTIVE  Pain Level (0-10 scale): 0/10  Any medication changes, allergies to medications, adverse drug reactions, diagnosis change, or new procedure performed?: [x] No    [] Yes (see summary sheet for update)  Subjective functional status/changes:   [] No changes reported  Pt reports she is frustrated with her progress and feels that she is not having the connection to make her shoulder move    OBJECTIVE    Modality rationale:  to improve the patients ability to    Min Type Additional Details    [] Estim:  []Unatt       []IFC  []Premod                        []Other:  []w/ice   []w/heat  Position:  Location:    [] Estim: []Att    []TENS instruct  []NMES                    []Other:  []w/US   []w/ice   []w/heat  Position:  Location:    []  Traction: [] Cervical       []Lumbar                       [] Prone          []Supine                       []Intermittent   []Continuous Lbs:  [] before manual  [] after manual    []  Ultrasound: []Continuous   [] Pulsed                           []1MHz   []3MHz W/cm2:  Location:    []  Iontophoresis with dexamethasone         Location: [] Take home patch   [] In clinic    []  Ice     []  heat  []  Ice massage  []  Laser   []  Anodyne Position:  Location:    []  Laser with stim  []  Other:  Position:  Location:    []  Vasopneumatic Device Pressure:       [] lo [] med [] hi   Temperature: [] lo [] med [] hi   [] Skin assessment post-treatment:  []intact []redness- no adverse reaction    []redness  adverse reaction:      min []Eval                  []Re-Eval       25 min Therapeutic Exercise:  [x] See flow sheet : increased per flow sheet   Rationale: increase ROM, increase strength and improve coordination to improve the patients ability to improve activity tolerance and mobility      min Therapeutic Activity:  []  See flow sheet :   Rationale:   to improve the patients ability to      40 min Neuromuscular Re-education:  [x]  See flow sheet : scapular stability and activation ex's per flow sheet   Rationale: increase strength, improve coordination and increase proprioception  to improve the patients ability to decrease pain and improve reaching     min Manual Therapy:     Rationale:  to      min Gait Training:  ___ feet with ___ device on level surfaces with ___ level of assist   Rationale: With   [] TE   [] TA   [] neuro   [] other: Patient Education: [x] Review HEP    [] Progressed/Changed HEP based on:   [] positioning   [] body mechanics   [] transfers   [] heat/ice application    [] other:      Other Objective/Functional Measures:      Pain Level (0-10 scale) post treatment: 0/10    ASSESSMENT/Changes in Function: Focused on tactile cues and facilitation of scapular stabilizers to improve reaching tolerance    Patient will continue to benefit from skilled PT services to modify and progress therapeutic interventions, address functional mobility deficits, address ROM deficits, address strength deficits, analyze and address soft tissue restrictions, analyze and cue movement patterns, analyze and modify body mechanics/ergonomics, assess and modify postural abnormalities and instruct in home and community integration to attain remaining goals. []  See Plan of Care  []  See progress note/recertification  []  See Discharge Summary         Progress towards goals / Updated goals:  Short Term Goals: To be accomplished in 3 weeks:  1. Pt will be independent and complaint w/ HEP to progress gains in PT. At PN: Goal met: Pt reports performing HEP. 6/1/18  2.  Pt will improve PROM to full flex and scap left shoulder for ease w/ sleeping. At PN:  Goal met, Flexion 155 degrees, Scpation 161 degrees, 7/13/2018  3. Pt will improve AAROM to > or = to 120 deg flex and scap as measured on pulleys for ease w/ dressing. At PN: Goal met: AAROM flexion and scaption 125 deg with pulleys. 6/14/18  Long Term Goals: To be accomplished in 8 weeks:  1. Pt will improve FOTO to > or = to 63 to demo improved function. At PN:  FOTO = 50, increased by 14 since [facility]. 7/6/18  2. 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 At PN: AROM flexion 63 deg, scap 54 deg. 7/10/18  Current: remains, AROM flexion 63 deg, scap 54 deg. 7/19/18  3. Pt will improve AROM right shoulder functional ER to > or = to occiput for ease putting on her shirt. At eval:Left Shoulder Functional ER = Left Ear, 7/13/2018  4. Pt will improve MMT left shoulder to > or = to -4-4/5 grossly for ease w/ return to unrestricted ADLs.    At Eval:  flexion 3-/5, abd 3-/5, IR 3/5, ER 4/5 7/13/18    PLAN  [x]  Upgrade activities as tolerated     [x]  Continue plan of care  []  Update interventions per flow sheet       []  Discharge due to:_  []  Other:_      Mingo Curry, PT 7/19/2018  9:59 AM    Future Appointments  Date Time Provider Claire Johnson   7/24/2018 8:30 AM Sara Rutherford PTA [facility]   7/26/2018 8:30 AM Maciej Escobedo, PT [facility]

## 2018-07-24 ENCOUNTER — HOSPITAL ENCOUNTER (OUTPATIENT)
Dept: PHYSICAL THERAPY | Age: 68
Discharge: HOME | End: 2018-07-24
Payer: MEDICARE

## 2018-07-24 PROCEDURE — 97112 NEUROMUSCULAR REEDUCATION: CPT

## 2018-07-24 PROCEDURE — 97110 THERAPEUTIC EXERCISES: CPT

## 2018-07-26 ENCOUNTER — HOSPITAL ENCOUNTER (OUTPATIENT)
Dept: PHYSICAL THERAPY | Age: 68
Discharge: HOME | End: 2018-07-26
Payer: MEDICARE

## 2018-07-26 PROCEDURE — 97110 THERAPEUTIC EXERCISES: CPT

## 2018-07-26 PROCEDURE — 97112 NEUROMUSCULAR REEDUCATION: CPT

## 2018-07-26 NOTE — PROGRESS NOTES
PT DAILY TREATMENT NOTE - [facility]     Patient Name: Jurgen Confer  Date:2018  : 1950  [x]  Patient  Verified  Payor: Kasey Allred / Plan: [place] MEDICARE PART A & B / Product Type: Medicare /    In time:829  Out time:927  Total Treatment Time (min): 58  Total Timed Codes (min): 58  1:1 Treatment Time ( [address] only): 25   Visit #: 1 of 12 (new script)    Treatment Area: Left shoulder pain [M25.512]    SUBJECTIVE  Pain Level (0-10 scale): 0  Any medication changes, allergies to medications, adverse drug reactions, diagnosis change, or new procedure performed?: [x] No    [] Yes (see summary sheet for update)  Subjective functional status/changes:   [] No changes reported  Patient reports despite slow overall improvement in strength noted. Patient reports MD follow up with MD as well expressing concern secondary to slowness in recovery of strength of the left shoulder.     OBJECTIVE    20 min Therapeutic Exercise:  [x] See flow sheet : Emphasis placed on improving available shoulder AROM and strength of the rotator cuff muscles   Rationale: increase ROM and increase strength to improve the patients ability to improve ease with household ADLs      38 min Neuromuscular Re-education:  [x]  See flow sheet : Emphasis placed on improving activation and recruitment of the glenohumeral and scapulothoracic musculature and improving scapulohumeral mechanics with UE elevation   Rationale: increase ROM, increase strength and increase proprioception  to improve the patients ability to improve ease with return to exercise.          With   [] TE   [] TA   [] neuro   [] other: Patient Education: [x] Review HEP    [] Progressed/Changed HEP based on:   [] positioning   [] body mechanics   [] transfers   [] heat/ice application    [] other:      Pain Level (0-10 scale) post treatment: 0    ASSESSMENT/Changes in Function: Patient continues to demonstrate progression with left shoulder strength but with progress demonstrated noted to be slow. Patient continues to benefit from tactile and verbal cuing to correct scapulohumeral rhythm with UE elevation. Patient will continue to benefit from skilled PT services to modify and progress therapeutic interventions, address functional mobility deficits, address ROM deficits, address strength deficits, analyze and address soft tissue restrictions, analyze and cue movement patterns and analyze and modify body mechanics/ergonomics to attain remaining goals. []  See Plan of Care  []  See progress note/recertification  []  See Discharge Summary         Progress towards goals / Updated goals:    Short Term Goals: To be accomplished in 3 weeks:  1. Pt will be independent and complaint w/ HEP to progress gains in PT. At PN: Goal met: Pt reports performing HEP. 6/1/18  2. Pt will improve PROM to full flex and scap left shoulder for ease w/ sleeping. At PN:  Goal met, Flexion 155 degrees, Scpation 161 degrees, 7/13/2018  3. Pt will improve AAROM to > or = to 120 deg flex and scap as measured on pulleys for ease w/ dressing. At PN: Goal met: AAROM flexion and scaption 125 deg with pulleys. 6/14/18  Long Term Goals: To be accomplished in 8 weeks:  1. Pt will improve FOTO to > or = to 63 to demo improved function. At PN:  FOTO = 50, increased by 14 since [facility]. 7/6/18  Current: Met, FOTO = 63, 7/26/2018  2. 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 At PN: AROM flexion 63 deg, scap 54 deg. 7/10/18  Current: remains, AROM flexion 63 deg, scap 54 deg. 7/19/18  3. Pt will improve AROM right shoulder functional ER to > or = to occiput for ease putting on her shirt. At eval:Left Shoulder Functional ER = Left Ear, 7/13/2018  4. Pt will improve MMT left shoulder to > or = to -4-4/5 grossly for ease w/ return to unrestricted ADLs.    At Eval: Oksana Hodgkin 3-/5, abd 3-/5, IR 3/5, ER 4/5 7/13/18    PLAN  [x]  Upgrade activities as tolerated     [x]  Continue plan of care  []  Update interventions per flow sheet       []  Discharge due to:_  []  Other:_      Charlanne Bumpers, PT 7/26/2018  7:51 AM    Future Appointments  Date Time Provider Claire Johnson   7/26/2018 8:30 AM Charlanne Bumpers, PT [facility]

## 2018-07-31 ENCOUNTER — HOSPITAL ENCOUNTER (OUTPATIENT)
Dept: PHYSICAL THERAPY | Age: 68
Discharge: HOME | End: 2018-07-31
Payer: MEDICARE

## 2018-07-31 PROCEDURE — 97112 NEUROMUSCULAR REEDUCATION: CPT

## 2018-07-31 PROCEDURE — 97110 THERAPEUTIC EXERCISES: CPT

## 2018-07-31 NOTE — PROGRESS NOTES
PT DAILY TREATMENT NOTE - [facility]  Patient Name: Dell Hurd Date:2018 : 1950 [x]  Patient  Verified Payor: [place] MEDICARE / Plan: Alex Kumar / Product Type: Medicare / In time:1055  Out time:1142 Total Treatment Time (min): 47 Total Timed Codes (min): 47 
1:1 Treatment Time ( only): 40 Visit #: 2 of 12 Treatment Area: Left shoulder pain [M25.512] SUBJECTIVE Pain Level (0-10 scale): 0 Any medication changes, allergies to medications, adverse drug reactions, diagnosis change, or new procedure performed?: [x] No    [] Yes (see summary sheet for update) Subjective functional status/changes:   [] No changes reported Patient reports overall self-perception of improvement in symptoms but reports continued quick fatigue with functional utilization of the left UE. OBJECTIVE 20 min Therapeutic Exercise:  [x] See flow sheet : Emphasis placed on improving available shoulder AROM and strength of the rotator cuff muscles Rationale: increase ROM and increase strength to improve the patients ability to improve ease with household ADLs 
   
27 min Neuromuscular Re-education:  [x]  See flow sheet : Emphasis placed on improving activation and recruitment of the glenohumeral and scapulothoracic musculature and improving scapulohumeral mechanics with UE elevation Rationale: increase ROM, increase strength and increase proprioception  to improve the patients ability to improve ease with return to exercise. With 
 [] TE 
 [] TA 
 [] neuro 
 [] other: Patient Education: [x] Review HEP [] Progressed/Changed HEP based on:  
[] positioning   [] body mechanics   [] transfers   [] heat/ice application   
[] other:   
 
Pain Level (0-10 scale) post treatment: 0 
 
ASSESSMENT/Changes in Function: Continued limitations in left shoulder flexion and abduction strength but patient noted with significant improvement in strength of the rotator cuff muscles.  Patient continues to benefit from the provision of tactile and verbal cuing to promote correct scapulohumeral rhythm. Patient will continue to benefit from skilled PT services to modify and progress therapeutic interventions, address functional mobility deficits, address ROM deficits, address strength deficits, analyze and address soft tissue restrictions, analyze and cue movement patterns and analyze and modify body mechanics/ergonomics to attain remaining goals. []  See Plan of Care 
[]  See progress note/recertification 
[]  See Discharge Summary Progress towards goals / Updated goals: 
 
Short Term Goals: To be accomplished in 3 weeks: 1. Pt will be independent and complaint w/ HEP to progress gains in PT. At PN: Goal met: Pt reports performing HEP. 6/1/18 2. Pt will improve PROM to full flex and scap left shoulder for ease w/ sleeping. At PN:  Goal met, Flexion 155 degrees, Scpation 161 degrees, 7/13/2018 3. Pt will improve AAROM to > or = to 120 deg flex and scap as measured on pulleys for ease w/ dressing. At PN: Goal met: AAROM flexion and scaption 125 deg with pulleys. 6/14/18 Long Term Goals: To be accomplished in 8 weeks: 1. Pt will improve FOTO to > or = to 63 to demo improved function. At PN:  FOTO = 50, increased by 14 since [facility]. 7/6/18 Current: Met, FOTO = 63, 7/26/2018 2. 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 At PN: AROM flexion 63 deg, scap 54 deg. 7/10/18 Current: remains, AROM flexion 63 deg, scap 54 deg. 7/19/18 3. Pt will improve AROM right shoulder functional ER to > or = to occiput for ease putting on her shirt. At eval:Left Shoulder Functional ER = Left Ear, 7/13/2018 4. Pt will improve MMT left shoulder to > or = to -4-4/5 grossly for ease w/ return to unrestricted ADLs. At Eval: Willia Day 3-/5, abd 3-/5, IR 3/5, ER 4/5 7/13/18 Current: Progressing, Flexion 3-/5, Abduction 3-/5, IR 5/5, ER 5/5, 7/31/2018 PLAN [x]  Upgrade activities as tolerated     [x]  Continue plan of care 
[]  Update interventions per flow sheet      
[]  Discharge due to:_ 
[]  Other:_   
 
Jude Pendleton, PT 7/31/2018  8:58 AM 
 
Future Appointments Date Time Provider Claire Johnson 7/31/2018 11:00 AM Jude Pendleton, PT [facility]  
8/2/2018 10:00 AM Jude Pendleton, PT [facility]  
8/7/2018 3:30 PM Jivignesh Stevense, PTA [facility]  
8/9/2018 9:00 AM Jude Pendleton, PT [facility]  
8/14/2018 9:30 AM Jude Pendleton, PT [facility]  
8/16/2018 9:30 AM Jude Pendleton, PT [facility]  
8/21/2018 9:00 AM Jdue Pendleton, PT [facility]  
8/24/2018 8:00 AM Shalaa Sorenson, PTA [facility]

## 2018-08-02 ENCOUNTER — HOSPITAL ENCOUNTER (OUTPATIENT)
Dept: PHYSICAL THERAPY | Age: 68
Discharge: HOME | End: 2018-08-02
Payer: MEDICARE

## 2018-08-02 PROCEDURE — 97112 NEUROMUSCULAR REEDUCATION: CPT

## 2018-08-02 PROCEDURE — 97110 THERAPEUTIC EXERCISES: CPT

## 2018-08-02 NOTE — PROGRESS NOTES
PT DAILY TREATMENT NOTE - [facility]  Patient Name: Rosa Hoover Date:2018 : 1950 [x]  Patient  Verified Payor: [place] MEDICARE / Plan: [address] / Product Type: Medicare / In OHQT:[id number]  Out time:1107 Total Treatment Time (min): 69 Total Timed Codes (min): 69 
1:1 Treatment Time ( only): 38 Visit #: 3 of 12 Treatment Area: Left shoulder pain [M25.512] SUBJECTIVE Pain Level (0-10 scale): 0 Any medication changes, allergies to medications, adverse drug reactions, diagnosis change, or new procedure performed?: [x] No    [] Yes (see summary sheet for update) Subjective functional status/changes:   [] No changes reported Patient reports noted overall improvement in ease of use of the left UE with completion of HEP exer OBJECTIVE 
  
30 min Therapeutic Exercise:  [x] See flow sheet : Emphasis placed on improving available shoulder AROM and strength of the rotator cuff muscles Rationale: increase ROM and increase strength to improve the patients ability to improve ease with household ADLs 
   
39 min Neuromuscular Re-education:  [x]  See flow sheet : Emphasis placed on improving activation and recruitment of the glenohumeral and scapulothoracic musculature and improving scapulohumeral mechanics with UE elevation Rationale: increase ROM, increase strength and increase proprioception  to improve the patients ability to improve ease with return to exercise. With 
 [] TE 
 [] TA 
 [] neuro 
 [] other: Patient Education: [x] Review HEP [] Progressed/Changed HEP based on:  
[] positioning   [] body mechanics   [] transfers   [] heat/ice application   
[] other:   
 
Pain Level (0-10 scale) post treatment: 0 
 
ASSESSMENT/Changes in Function: Patient continues to benefit from verbal and tactile cuing to promote improved scapulohumeral rhythm with patient noted with reduced endurance with maintenance of correct scapulohumeral mechanics.  Initiated bilateral UE overhead elevation in order to improve scapulohumeral mechanics and symmetry of UE utilization of functional bilateral OH movements. Patient will continue to benefit from skilled PT services to modify and progress therapeutic interventions, address functional mobility deficits, address ROM deficits, address strength deficits, analyze and address soft tissue restrictions, analyze and cue movement patterns, analyze and modify body mechanics/ergonomics and assess and modify postural abnormalities to attain remaining goals. []  See Plan of Care 
[]  See progress note/recertification 
[]  See Discharge Summary Progress towards goals / Updated goals: 
 
Short Term Goals: To be accomplished in 3 weeks: 1. Pt will be independent and complaint w/ HEP to progress gains in PT. At PN: Goal met: Pt reports performing HEP. 6/1/18 2. Pt will improve PROM to full flex and scap left shoulder for ease w/ sleeping. At PN:  Goal met, Flexion 155 degrees, Scpation 161 degrees, 7/13/2018 3. Pt will improve AAROM to > or = to 120 deg flex and scap as measured on pulleys for ease w/ dressing. At PN: Goal met: AAROM flexion and scaption 125 deg with pulleys. 6/14/18 Long Term Goals: To be accomplished in 8 weeks: 1. Pt will improve FOTO to > or = to 63 to demo improved function. At PN:  FOTO = 50, increased by 14 since [facility]. 7/6/18 Current: Met, FOTO = 63, 7/26/2018 2. 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 At PN: AROM flexion 63 deg, scap 54 deg. 7/10/18 Current: remains, AROM flexion 63 deg, scap 54 deg. 7/19/18 3. Pt will improve AROM right shoulder functional ER to > or = to occiput for ease putting on her shirt. At eval:Left Shoulder Functional ER = Left Ear, 7/13/2018 4. Pt will improve MMT left shoulder to > or = to -4-4/5 grossly for ease w/ return to unrestricted ADLs. At Eval: Brandi Crystal 3-/5, abd 3-/5, IR 3/5, ER 4/5 7/13/18 Current: Progressing, Flexion 3-/5, Abduction 3-/5, IR 5/5, ER 5/5, 7/31/2018 PLAN [x]  Upgrade activities as tolerated     [x]  Continue plan of care 
[]  Update interventions per flow sheet      
[]  Discharge due to:_ 
[]  Other:_   
 
Dawson Bennett PT 8/2/2018  7:52 AM 
 
Future Appointments Date Time Provider Claire Johnson 8/2/2018 10:00 AM Dawson Bennett, PT [facility]  
8/7/2018 3:30 PM Mara Suh PTA [facility]  
8/9/2018 9:00 AM Dawson Bennett PT [facility]  
8/14/2018 9:30 AM Dawson Bennett PT [facility]  
8/16/2018 9:30 AM Dawson Bennett PT [facility]  
8/21/2018 9:00 AM Dawson Benentt PT [facility]  
8/24/2018 8:00 AM Mara Suh PTA [facility]

## 2018-08-06 ENCOUNTER — APPOINTMENT (OUTPATIENT)
Dept: PHYSICAL THERAPY | Age: 68
End: 2018-08-06
Payer: MEDICARE

## 2018-08-07 ENCOUNTER — APPOINTMENT (OUTPATIENT)
Dept: PHYSICAL THERAPY | Age: 68
End: 2018-08-07
Payer: MEDICARE

## 2018-08-09 ENCOUNTER — HOSPITAL ENCOUNTER (OUTPATIENT)
Dept: PHYSICAL THERAPY | Age: 68
Discharge: HOME | End: 2018-08-09
Payer: MEDICARE

## 2018-08-09 PROCEDURE — 97110 THERAPEUTIC EXERCISES: CPT

## 2018-08-09 PROCEDURE — G8984 CARRY CURRENT STATUS: HCPCS

## 2018-08-09 PROCEDURE — 97112 NEUROMUSCULAR REEDUCATION: CPT

## 2018-08-09 PROCEDURE — G8985 CARRY GOAL STATUS: HCPCS

## 2018-08-09 NOTE — PROGRESS NOTES
PT DAILY TREATMENT NOTE - [facility]     Patient Name: Danielle Mcclain  Date:2018  : 1950  [x]  Patient  Verified  Payor: Aureliano Drafts / Plan: [place] MEDICARE PART A & B / Product Type: Medicare /    In QBVL:[id number]  Out time:1001  Total Treatment Time (min): 62  Total Timed Codes (min): 62  1:1 Treatment Time ( [address] only): 25   Visit #: 4 of 12    Treatment Area: Left shoulder pain [M25.512]    SUBJECTIVE  Pain Level (0-10 scale): 0  Any medication changes, allergies to medications, adverse drug reactions, diagnosis change, or new procedure performed?: [x] No    [] Yes (see summary sheet for update)  Subjective functional status/changes:   [] No changes reported  Patient reports overall significant improvement in symptoms with alternating exercises with rest day every other day. OBJECTIVE    30 min Therapeutic Exercise:  [x] See flow sheet : Emphasis placed on improving available shoulder AROM and strength of the rotator cuff muscles   Rationale: increase ROM and increase strength to improve the patients ability to improve ease with household ADLs      32 min Neuromuscular Re-education:  [x]  See flow sheet : Emphasis placed on improving activation and recruitment of the glenohumeral and scapulothoracic musculature and improving scapulohumeral mechanics with UE elevation   Rationale: increase ROM, increase strength and increase proprioception  to improve the patients ability to improve ease with return to exercise.         With   [] TE   [] TA   [] neuro   [] other: Patient Education: [x] Review HEP    [] Progressed/Changed HEP based on:   [] positioning   [] body mechanics   [] transfers   [] heat/ice application    [] other:      Pain Level (0-10 scale) post treatment: 0    ASSESSMENT/Changes in Function: Patient continues to demonstrate a slow progression objectively with respect to improvements in available left shoulder AROM and strength but subjectively reports overall self-perceived greater functional ease with use of the left UE. Patient has reinitiated independent exercise regime, with modification as appropriate and incorporation of physical therapy exercises with completion, with patient reporting overall improved ease with functional progression with alternating workouts and rest days every other day. At this time patient patient to be reduced to frequency of 1x/week with emphasis placed on a progressive HEP and oversight to ensure proper independent progression to improve ease with return to PLOF. Patient will continue to benefit from skilled PT services to modify and progress therapeutic interventions, address functional mobility deficits, address ROM deficits, address strength deficits, analyze and address soft tissue restrictions, analyze and cue movement patterns and analyze and modify body mechanics/ergonomics to attain remaining goals. []  See Plan of Care  [x]  See progress note/recertification  []  See Discharge Summary         Progress towards goals / Updated goals:    Short Term Goals: To be accomplished in 3 weeks:  1. Pt will be independent and complaint w/ HEP to progress gains in PT. At PN: Goal met: Pt reports performing HEP. 6/1/18  2. Pt will improve PROM to full flex and scap left shoulder for ease w/ sleeping. At PN:  Goal met, Flexion 155 degrees, Scpation 161 degrees, 7/13/2018  3. Pt will improve AAROM to > or = to 120 deg flex and scap as measured on pulleys for ease w/ dressing. At PN: Goal met: AAROM flexion and scaption 125 deg with pulleys. 6/14/18  Long Term Goals: To be accomplished in 8 weeks:  1. Pt will improve FOTO to > or = to 63 to demo improved function. At PN:  FOTO = 50, increased by 14 since [facility]. 7/6/18  Current: Met, FOTO = 63, 7/26/2018  2. 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    At PN: AROM flexion 63 deg, scap 54 deg. 7/10/18  Current: Progressing, Flexion AROM 67 deg, Scaption AROM 57 deg, 8/9/2018  3. Pt will improve AROM right shoulder functional ER to > or = to occiput for ease putting on her shirt. At eval:Left Shoulder Functional ER = Left Ear, 7/13/2018  Current: Remains, Left Shoulder Functional ER = Left Ear, 8/9/2018  4. Pt will improve MMT left shoulder to > or = to -4-4/5 grossly for ease w/ return to unrestricted ADLs.    At Eval: Yanira Carrier 3-/5, abd 3-/5, IR 3/5, ER 4/5 7/13/18  Current: Progressing, Flexion 3-/5, Abduction 3-/5, IR 5/5, ER 5/5, 7/31/2018    PLAN  [x]  Upgrade activities as tolerated     [x]  Continue plan of care  []  Update interventions per flow sheet       []  Discharge due to:_  []  Other:_      Memo Paul, PT 8/9/2018  7:54 AM    Future Appointments  Date Time Provider Claire Johnson   8/9/2018 9:00 AM Mmeo Paul PT [facility]   8/14/2018 9:30 AM Memo Paul PT [facility]   8/16/2018 9:30 AM Gui Augustine PT [facility]   8/21/2018 9:00 AM Memo Paul PT [facility]   8/24/2018 8:00 AM Lexa Avalos, PTA [facility]

## 2018-08-09 NOTE — PROGRESS NOTES
[facility]              [address]        [place], [address] [place]   [phone] fax    Continued Plan of Care/ Re-certification for Physical Therapy Services    Patient name: Rosa Hoover Start of Care: 2018   Referral source: Jarrod Dillon, * : 1950   Medical/Treatment Diagnosis: Left shoulder pain [M25.512] Onset Date:2018     Prior Hospitalization: see medical history Provider#: [id number]   Medications: Verified on Patient Summary List    Comorbidities: arthritis, back pain, GI disease, HBP, sleep dysfunction    Prior Level of Function: Hx of left shoulder pain, right hand dominant, (I) w/ ADLs and MADLs, retired   Visits from [place] of Care: 21    Missed Visits: 0    The Plan of Care and following information is based on the patient's current status:    Short Term Goals: To be accomplished in 3 weeks:  1. Pt will be independent and complaint w/ HEP to progress gains in PT. At Last PN: Goal met: Pt reports performing HEP  2. Pt will improve PROM to full flex and scap left shoulder for ease w/ sleeping. At Last PN:  Goal met, Flexion 155 degrees, Scpation 161 degrees  3. Pt will improve AAROM to > or = to 120 deg flex and scap as measured on pulleys for ease w/ dressing. At Last PN: Goal met: AAROM flexion and scaption 125 deg with pulleys    Long Term Goals: To be accomplished in 8 weeks:  1. Pt will improve FOTO to > or = to 63 to demo improved function. At Last PN:  FOTO = 50, increased by 14 since [facility]  At PN: Met, FOTO = 63  2. 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 At Last PN: AROM flexion 63 deg, scap 54 deg  At PN: Progressing, Flexion AROM 67 deg, Scaption AROM 57 deg  3. Pt will improve AROM right shoulder functional ER to > or = to occiput for ease putting on her shirt.    At eval:Left Shoulder Functional ER = Left Ear  At PN: Remains, Left Shoulder Functional ER = Left Ear  4. Pt will improve MMT left shoulder to > or = to -4-4/5 grossly for ease w/ return to unrestricted ADLs. At Eval: Rondal Crumb 3-/5, abd 3-/5, IR 3/5, ER 4/5   At PN: Progressing, Flexion 3-/5, Abduction 3-/5, IR 5/5, ER 5/5    Key functional changes: See above goals      Problems/ barriers to goal attainment: Slow progression of functional left shoulder AROM and strength. Problem List: pain affecting function, decrease ROM, decrease strength, decrease ADL/ functional abilitiies, decrease activity tolerance and decrease flexibility/ joint mobility    Treatment Plan: Therapeutic exercise, Therapeutic activities, Neuromuscular re-education, Physical agent/modality, Manual therapy, Patient education, Self Care training, Functional mobility training and Home safety training     Goals for this certification period: Continue with unmet goals above. Frequency / Duration: Patient to be seen 1 times per week for 5 weeks:    Assessment / Recommendations:    Patient continues to demonstrate a slow progression objectively with respect to improvements in available left shoulder AROM and strength but subjectively reports overall self-perceived greater functional ease with use of the left UE. Patient has reinitiated independent exercise regime, with modification as appropriate and incorporation of physical therapy exercises with completion, with patient reporting overall improved ease with functional progression with alternating workouts and rest days every other day.  At this time patient patient to be reduced to frequency of 1x/week with emphasis placed on a progressive HEP and oversight to ensure proper independent progression to improve ease with return to PLOF.     Patient will continue to benefit from skilled PT services to modify and progress therapeutic interventions, address functional mobility deficits, address ROM deficits, address strength deficits, analyze and address soft tissue restrictions, analyze and cue movement patterns and analyze and modify body mechanics/ergonomics to attain remaining goals. G-Codes (GP)  Carry   Current  CJ= 20-39%   [id number] Goal  CJ= 20-39%    The severity rating is based on clinical judgment and the FOTO score. Certification Period: 8/9/2019 - 9/8/2018    Melinda Craven, PT 8/9/2018 9:19 AM    ________________________________________________________________________  I certify that the above Therapy Services are being furnished while the patient is under my care. I agree with the treatment plan and certify that this therapy is necessary. [] I have read the above and request that my patient continue as recommended.   [] I have read the above report and request that my patient continue therapy with the following changes/special instructions: _______________________________________  [] I have read the above report and request that my patient be discharged from therapy    Physician's Signature:____________Date:_________TIME:________    ** Signature, Date and Time must be completed for valid certification **  Please sign and return to [facility] [address], [address] [place]   [phone] fax

## 2018-08-14 ENCOUNTER — APPOINTMENT (OUTPATIENT)
Dept: PHYSICAL THERAPY | Age: 68
End: 2018-08-14
Payer: MEDICARE

## 2018-08-16 ENCOUNTER — APPOINTMENT (OUTPATIENT)
Dept: PHYSICAL THERAPY | Age: 68
End: 2018-08-16
Payer: MEDICARE

## 2018-08-17 ENCOUNTER — HOSPITAL ENCOUNTER (OUTPATIENT)
Dept: PHYSICAL THERAPY | Age: 68
Discharge: HOME | End: 2018-08-17
Payer: MEDICARE

## 2018-08-17 PROCEDURE — 97112 NEUROMUSCULAR REEDUCATION: CPT

## 2018-08-17 PROCEDURE — 97110 THERAPEUTIC EXERCISES: CPT

## 2018-08-17 NOTE — PROGRESS NOTES
PT DAILY TREATMENT NOTE - [facility]     Patient Name: Gary Alejandro  Date:2018  : 1950  [x]  Patient  Verified  Payor: Stu Cuenca / Plan: [place] MEDICARE PART A & B / Product Type: Medicare /    In time:12:02  Out time:12:47  Total Treatment Time (min): 45  Total Timed Codes (min): 45  1:1 Treatment Time ( [address] only): 39   Visit #: 5 of 12    Treatment Area: Left shoulder pain [M25.512]    SUBJECTIVE  Pain Level (0-10 scale): 0  Any medication changes, allergies to medications, adverse drug reactions, diagnosis change, or new procedure performed?: [x] No    [] Yes (see summary sheet for update)  Subjective functional status/changes:   [] No changes reported  Pt reports she feels that her arm is not as fatigued everyday. OBJECTIVE        Min Type Additional Details    [] Estim:  []Unatt       []IFC  []Premod                        []Other:  []w/ice   []w/heat  Position:  Location:    [] Estim: []Att    []TENS instruct  []NMES                    []Other:  []w/US   []w/ice   []w/heat  Position:  Location:    []  Traction: [] Cervical       []Lumbar                       [] Prone          []Supine                       []Intermittent   []Continuous Lbs:  [] before manual  [] after manual    []  Ultrasound: []Continuous   [] Pulsed                           []1MHz   []3MHz W/cm2:  Location:    []  Iontophoresis with dexamethasone         Location: [] Take home patch   [] In clinic    []  Ice     []  heat  []  Ice massage  []  Laser   []  Anodyne Position:  Location:    []  Laser with stim  []  Other:  Position:  Location:    []  Vasopneumatic Device Pressure:       [] lo [] med [] hi   Temperature: [] lo [] med [] hi   [] Skin assessment post-treatment:  []intact []redness- no adverse reaction    []redness  adverse reaction:       20 min Therapeutic Exercise:  [x] See flow sheet :   Rationale: increase ROM and increase strength to improve the patients ability to increase tolerance to activities.            25 min Neuromuscular Re-education:  [x]  See flow sheet :scapular stabilization. Rationale: increase ROM and increase strength  to improve the patients ability to increase ease with lifting activities.                  With   [] TE   [] TA   [] neuro   [] other: Patient Education: [x] Review HEP    [] Progressed/Changed HEP based on:   [] positioning   [] body mechanics   [] transfers   [] heat/ice application    [] other:      Other Objective/Functional Measures: Left Shoulder Functional ER = Left Ear     Pain Level (0-10 scale) post treatment: 0    ASSESSMENT/Changes in Function: Progressing pt as tolerated with most against gravity exercises. Patient will continue to benefit from skilled PT services to modify and progress therapeutic interventions, address functional mobility deficits, address ROM deficits, address strength deficits and analyze and address soft tissue restrictions to attain remaining goals. []  See Plan of Care  []  See progress note/recertification  []  See Discharge Summary         Progress towards goals / Updated goals:  Short Term Goals: To be accomplished in 3 weeks:  1. Pt will be independent and complaint w/ HEP to progress gains in PT. At PN: Goal met: Pt reports performing HEP. 6/1/18  2. Pt will improve PROM to full flex and scap left shoulder for ease w/ sleeping. At PN:  Goal met, Flexion 155 degrees, Scpation 161 degrees, 7/13/2018  3. Pt will improve AAROM to > or = to 120 deg flex and scap as measured on pulleys for ease w/ dressing. At PN: Goal met: AAROM flexion and scaption 125 deg with pulleys. 6/14/18  Long Term Goals: To be accomplished in 8 weeks:  1. Pt will improve FOTO to > or = to 63 to demo improved function. At PN:  FOTO = 50, increased by 14 since [facility]. 7/6/18  Current: Met, FOTO = 63, 7/26/2018  2. 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 At PN: AROM flexion 63 deg, scap 54 deg. 7/10/18  Current: Progressing, Flexion AROM 67 deg, Scaption AROM 57 deg, 8/9/2018  3. Pt will improve AROM right shoulder functional ER to > or = to occiput for ease putting on her shirt. At eval:Left Shoulder Functional ER = Left Ear, 7/13/2018  Current: Remains, Left Shoulder Functional ER = Left Ear, 8/17/2018  4. Pt will improve MMT left shoulder to > or = to -4-4/5 grossly for ease w/ return to unrestricted ADLs.    At [facility]: Reena Martin 3-/5, abd 3-/5, IR 3/5, ER 4/5 7/13/18  Current: Progressing, Flexion 3-/5, Abduction 3-/5, IR 5/5, ER 5/5, 7/31/2018    PLAN  []  Upgrade activities as tolerated     [x]  Continue plan of care  []  Update interventions per flow sheet       []  Discharge due to:_  []  Other:_      Deb Covarrubias PTA 8/17/2018  12:00 PM    Future Appointments  Date Time Provider Claire Johnson   8/24/2018 8:00 AM Deb Covarrubias PTA [facility]   8/31/2018 9:00 AM Deb Covarrubias PTA [facility]   9/7/2018 9:00 AM Robson Hidalgo PT [facility]

## 2018-08-21 ENCOUNTER — APPOINTMENT (OUTPATIENT)
Dept: PHYSICAL THERAPY | Age: 68
End: 2018-08-21
Payer: MEDICARE

## 2018-08-24 ENCOUNTER — HOSPITAL ENCOUNTER (OUTPATIENT)
Dept: PHYSICAL THERAPY | Age: 68
Discharge: HOME | End: 2018-08-24
Payer: MEDICARE

## 2018-08-24 PROCEDURE — G8986 CARRY D/C STATUS: HCPCS

## 2018-08-24 PROCEDURE — 97112 NEUROMUSCULAR REEDUCATION: CPT

## 2018-08-24 PROCEDURE — G8985 CARRY GOAL STATUS: HCPCS

## 2018-08-24 PROCEDURE — 97110 THERAPEUTIC EXERCISES: CPT

## 2018-08-24 NOTE — PROGRESS NOTES
[facility]              [address]        [place], [address] [place]   [phone] fax    Continued Plan of Care/ Re-certification for Physical Therapy Services    Patient name: Jurgen Orozco Start of Care: 2018   Referral source: Nohemi Oh, * : 1950   Medical/Treatment Diagnosis: Left shoulder pain [M25.512] Onset Date:2018     Prior Hospitalization: see medical history Provider#: [id number]   Medications: Verified on Patient Summary List    Comorbidities: arthritis, back pain, GI disease, HBP, sleep dysfunction    Prior Level of Function: Hx of left shoulder pain, right hand dominant, (I) w/ ADLs and MADLs, retired   [place] [facility] [place] [facility] [place]: [facility]    Missed Visits: 0    The Plan of Care and following information is based on the patient's current status:    Short Term Goals: To be accomplished in 3 weeks:  1. Pt will be independent and complaint w/ HEP to progress gains in PT. At Last PN: Goal met: Pt reports performing HEP  2. Pt will improve PROM to full flex and scap left shoulder for ease w/ sleeping. At Last PN:  Goal met, Flexion 155 degrees, Scpation 161 degrees  3. Pt will improve AAROM to > or = to 120 deg flex and scap as measured on pulleys for ease w/ dressing. At Last PN: Goal met: AAROM flexion and scaption 125 deg with pulleys    Long Term Goals: To be accomplished in 8 weeks:  1. Pt will improve FOTO to > or = to 63 to demo improved function. At Last PN:  FOTO = 50, increased by 14 since SOC  At PN: Met, FOTO = 63  2. 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 At Last PN: AROM flexion 63 deg, scap 54 deg  At PN: Progressing, Flexion AROM 74 deg, Scaption AROM 55 deg  3. Pt will improve AROM right shoulder functional ER to > or = to occiput for ease putting on her shirt.    At Eval: Left Shoulder Functional ER = Left Ear  At PN: Remains, Left Shoulder Functional ER = side of head  4. Pt will improve MMT left shoulder to > or = to -4-4/5 grossly for ease w/ return to unrestricted ADLs. At Eval: Forestine Anaya 3-/5, abd 3-/5, IR 3/5, ER 4/5  At PN: Progressing, Flexion 3-/5, Abduction 3-/5, IR 5/5, ER 5/5    Key functional changes: See above goals. Problems/ barriers to goal attainment: Slow progression towards goals demonstrated. Problem List: pain affecting function, decrease ROM, decrease strength, decrease ADL/ functional abilitiies, decrease activity tolerance, decrease flexibility/ joint mobility and decrease transfer abilities    Treatment Plan: Therapeutic exercise, Therapeutic activities, Neuromuscular re-education, Manual therapy, Patient education, Self Care training, Functional mobility training and Home safety training     Goals for this certification period: Continue with unmet goals above. Frequency / Duration: Patient to be seen 1 times every 2-3 week for 6 weeks:    Assessment / Recommendations:    Pt continues to make slow gains with strength and AROM with patient having demonstrated good compliance with progression of prescribed HEP with emphasis having been placed on utilization of a progressive home program to improve overall strength and mobility. Pt wishes to decrease frequency of visits due to slow progress with patient to be seen once every 2-3 weeks to allow for continued monitoring of progression and update of HEP for further functional gains in shoulder ROM and strength. Patient will continue to benefit from skilled PT services to modify and progress therapeutic interventions, address functional mobility deficits, address ROM deficits, address strength deficits, analyze and address soft tissue restrictions, analyze and cue movement patterns, analyze and modify body mechanics/ergonomics and assess and modify postural abnormalities to attain remaining goals.     G-Codes (GP)  Carry   Current  CJ= 20-39%   [id number] Goal  CJ= 20-39%    The severity rating is based on clinical judgment and the FOTO score. Certification Period: 8/24/2018 - 10/23/2018    Lynda Pérez, PT 8/24/2018 10:57 AM    ________________________________________________________________________  I certify that the above Therapy Services are being furnished while the patient is under my care. I agree with the treatment plan and certify that this therapy is necessary. [] I have read the above and request that my patient continue as recommended.   [] I have read the above report and request that my patient continue therapy with the following changes/special instructions: _______________________________________  [] I have read the above report and request that my patient be discharged from therapy    Physician's Signature:____________Date:_________TIME:________    ** Signature, Date and Time must be completed for valid certification **  Please sign and return to [facility] [address] [place], [address] [place]   [phone] fax

## 2018-08-24 NOTE — PROGRESS NOTES
PT DAILY TREATMENT NOTE - [facility]     Patient Name: Danielle Mcclain  Date:2018  : 1950  [x]  Patient  Verified  [place]or: [place] MED[place] / Plan: [place] MEDICARE PART A & B / Product Type: Medicare /    In time:8:00  Out time:8:51  Total Treatment Time (min): 51  Total Timed Codes (min): 51  1:1 Treatment Time ( [address] only): 46   Visit #: 6 of 12    Treatment Area: Left shoulder pain [M25.512]    SUBJECTIVE  Pain Level (0-10 scale): 0  Any medication changes, allergies to medications, adverse drug reactions, diagnosis change, or new procedure performed?: [x] No    [] Yes (see summary sheet for update)  Subjective functional status/changes:   [] No changes reported  Pt reports that she is making very slow progress. OBJECTIVE        Min Type Additional Details    [] Estim:  []Unatt       []IFC  []Premod                        []Other:  []w/ice   []w/heat  Position:  Location:    [] Estim: []Att    []TENS instruct  []NMES                    []Other:  []w/US   []w/ice   []w/heat  Position:  Location:    []  Traction: [] Cervical       []Lumbar                       [] Prone          []Supine                       []Intermittent   []Continuous Lbs:  [] before manual  [] after manual    []  Ultrasound: []Continuous   [] Pulsed                           []1MHz   []3MHz W/cm2:  Location:    []  Iontophoresis with dexamethasone         Location: [] Take home patch   [] In clinic    []  Ice     []  heat  []  Ice massage  []  Laser   []  Anodyne Position:  Location:    []  Laser with stim  []  Other:  Position:  Location:    []  Vasopneumatic Device Pressure:       [] lo [] med [] hi   Temperature: [] lo [] med [] hi   [] Skin assessment post-treatment:  []intact []redness- no adverse reaction    []redness  adverse reaction:       26 min Therapeutic Exercise:  [x] See flow sheet :   Rationale: increase ROM and increase strength to improve the patients ability to increase tolerance to activities.            25 min Neuromuscular Re-education:  [x]  See flow sheet :scapular stabilization. Rationale: increase ROM and increase strength  to improve the patients ability to increase ease with lifting activities.                  With   [] TE   [] TA   [] neuro   [] other: Patient Education: [x] Review HEP    [] Progressed/Changed HEP based on:   [] positioning   [] body mechanics   [] transfers   [] heat/ice application    [] other:      Other Objective/Functional Measures: see goals     Pain Level (0-10 scale) post treatment: 0    ASSESSMENT/Changes in Function: Pt continues to make slow gains with strength and AROM. Pt wishes to decrease frequency of visits due to the slow progress. []  See Plan of Care  [x]  See progress note/recertification  []  See Discharge Summary         Progress towards goals / Updated goals:  Short Term Goals: To be accomplished in 3 weeks:  1. Pt will be independent and complaint w/ HEP to progress gains in PT. At PN: Goal met: Pt reports performing HEP. 6/1/18  2. Pt will improve PROM to full flex and scap left shoulder for ease w/ sleeping. At PN:  Goal met, Flexion 155 degrees, Scpation 161 degrees, 7/13/2018  3. Pt will improve AAROM to > or = to 120 deg flex and scap as measured on pulleys for ease w/ dressing. At PN: Goal met: AAROM flexion and scaption 125 deg with pulleys. 6/14/18  Long Term Goals: To be accomplished in 8 weeks:  1. Pt will improve FOTO to > or = to 63 to demo improved function. At PN:  FOTO = 50, increased by 14 [facility]. 7/6/18  Current: Met, FOTO = 63, 7/26/2018  2. 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 At PN: AROM flexion 63 deg, scap 54 deg. 7/10/18  Current: Progressing, Flexion AROM 74 deg, Scaption AROM 55 deg, 8/24/2018  3. Pt will improve AROM right shoulder functional ER to > or = to occiput for ease putting on her shirt.    At eval:Left Shoulder Functional ER = Left Ear, 7/13/2018  Current: Remains, Left Shoulder Functional ER = side of head, 8/24/2018  4. Pt will improve MMT left shoulder to > or = to -4-4/5 grossly for ease w/ return to unrestricted ADLs.    At Eval: Lisa Mantilla 3-/5, abd 3-/5, IR 3/5, ER 4/5 7/13/18  Current: Progressing, Flexion 3-/5, Abduction 3-/5, IR 5/5, ER 5/5, 8/24/2018       PLAN  []  Upgrade activities as tolerated     [x]  Continue plan of care  []  Update interventions per flow sheet       []  Discharge due to:_  []  Other:_       Jose Alfredo Abdalla PTA 8/24/2018  8:09 AM    Future Appointments  Date Time Provider Claire Johnson   8/31/2018 9:00 AM Jose Alfredo Abdalla PTA [facility]   9/7/2018 9:00 AM Carlos De Guzman PT [facility]

## 2018-08-31 ENCOUNTER — APPOINTMENT (OUTPATIENT)
Dept: PHYSICAL THERAPY | Age: 68
End: 2018-08-31
Payer: MEDICARE

## 2018-09-07 ENCOUNTER — APPOINTMENT (OUTPATIENT)
Dept: PHYSICAL THERAPY | Age: 68
End: 2018-09-07
Payer: MEDICARE

## 2018-09-14 ENCOUNTER — HOSPITAL ENCOUNTER (OUTPATIENT)
Dept: PHYSICAL THERAPY | Age: 68
Discharge: HOME | End: 2018-09-14
Payer: MEDICARE

## 2018-09-14 PROCEDURE — 97110 THERAPEUTIC EXERCISES: CPT

## 2018-09-14 PROCEDURE — G8985 CARRY GOAL STATUS: HCPCS

## 2018-09-14 PROCEDURE — G8986 CARRY D/C STATUS: HCPCS

## 2018-09-14 NOTE — PROGRESS NOTES
[facility] [address] [facility] [place], [address] [place]  
[phone] fax Discharge Summary Patient name: Diamond Wisdom Start of Care: 2018 Referral source: Logan Lew, * : 1950 Medical/Treatment Diagnosis: Left shoulder pain [M25.512] Onset Date:2018 Prior Hospitalization: see medical history Provider#: [id number] Medications: Verified on Patient Summary List   
Comorbidities: arthritis, back pain, GI disease, HBP, sleep dysfunction  
 Prior Level of Function: Hx of left shoulder pain, right hand dominant, (I) w/ ADLs and MADLs, retired Visits from Start of Care: 24    Missed Visits: 0 Reporting Period : 2018 to 2018 Summary of Care: 
 
Short Term Goals: To be accomplished in 3 weeks: 1. Pt will be independent and complaint w/ HEP to progress gains in PT. At PN: Goal met: Pt reports performing HEP 2. Pt will improve PROM to full flex and scap left shoulder for ease w/ sleeping. At PN:  Goal met, Flexion 155 degrees, Scpation 161 degrees 3. Pt will improve AAROM to > or = to 120 deg flex and scap as measured on pulleys for ease w/ dressing. At PN: Goal met: AAROM flexion and scaption 125 deg with pulleys 
  
Long Term Goals: To be accomplished in 8 weeks: 1. Pt will improve FOTO to > or = to 63 to demo improved function. At PN: Met, FOTO = 63 
2. 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 At PN: Progressing, Flexion AROM 74 deg, Scaption AROM 55 deg At DC: Remains, Flexion AROM 77 degrees, Scaption AROM 55 degrees 3. Pt will improve AROM right shoulder functional ER to > or = to occiput for ease putting on her shirt. At PN: Remains, Left Shoulder Functional ER = side of head At DC: Remains, Left Shoulder Functional ER = Left Ear 4.  Pt will improve MMT left shoulder to > or = to -4-4/5 grossly for ease w/ return to unrestricted ADLs. At PN: Progressing, Flexion 3-/5, Abduction 3-/5, IR 5/5, ER 5/5 At DC: Remains, Flexion 3-/5, Abduction 3-/5, IR 5/5, ER 5/5 
 
G-Codes (GP) Carry  Goal  CJ= 20-39%  D/C  CJ= 20-39% The severity rating is based on clinical judgment and the FOTO Score score. ASSESSMENT/RECOMMENDATIONS: 
 
At this time patient to be discharged secondary to relative objective plateau having been demonstrated despite patient subjectively reporting self-perceived small functional improvements with use of the left UE with ADLs. Have emphasized a primarily independently driven exercise program with patient self-progression of exercises with completion of home regime with emphasis placed on promoting correct scapulohumeral rhythm with UE elevation and elevation >90 degrees. Verbal review with patient regarding exercise prescription and cuing with movement and progressions. [x]Discontinue therapy: [x]Patient has reached or is progressing toward set goals []Patient is non-compliant or has abdicated 
    []Due to lack of appreciable progress towards set goals Opal Ruffin, PT 9/14/2018 10:44 AM

## 2018-09-14 NOTE — PROGRESS NOTES
PT DAILY TREATMENT NOTE - [facility]  Patient Name: Nora Rodriguez Date:2018 : 1950 [x]  Patient  Verified Payor: [place] MEDICARE / Plan: [facility] / Product Type: Medicare / In [address] [facility] Total Treatment Time (min): 30 Total Timed Codes (min): 30 
1:1 Treatment Time ([facility] only): 30 Visit #: 1 of 3 (signed re-cert) Treatment Area: Left shoulder pain [M25.512] SUBJECTIVE Pain Level (0-10 scale): 0 Any medication changes, allergies to medications, adverse drug reactions, diagnosis change, or new procedure performed?: [x] No    [] Yes (see summary sheet for update) Subjective functional status/changes:   [] No changes reported Patient reports continued full compliance with prescribed HEP OBJECTIVE 30 min Therapeutic Exercise:  - See flow sheet : Verbal review with patient regarding current exercise regime and modifications with completion Rationale: increase ROM and increase strength to improve the patients ability to improve ease with household ADLs 
     
With 
 [] TE 
 [] TA 
 [] neuro 
 [] other: Patient Education: [x] Review HEP [] Progressed/Changed HEP based on:  
[] positioning   [] body mechanics   [] transfers   [] heat/ice application   
[] other:   
 
Pain Level (0-10 scale) post treatment: 0 
 
ASSESSMENT/Changes in Function: At this time patient to be discharged secondary to relative objective plateau having been demonstrated despite patient subjectively reporting self-perceived small functional improvements with use of the left UE with ADLs. Have emphasized a primarily independently driven exercise program with patient self-progression of exercises with completion of home regime with emphasis placed on promoting correct scapulohumeral rhythm with UE elevation and elevation >90 degrees. Verbal review with patient regarding exercise prescription and cuing with movement and progressions. []  See Plan of Care []  See progress note/recertification 
[x]  See Discharge Summary Progress towards goals / Updated goals: 
 
Short Term Goals: To be accomplished in 3 weeks: 1. Pt will be independent and complaint w/ HEP to progress gains in PT. At PN: Goal met: Pt reports performing HEP. 6/1/18 2. Pt will improve PROM to full flex and scap left shoulder for ease w/ sleeping. At PN:  Goal met, Flexion 155 degrees, Scpation 161 degrees, 7/13/2018 3. Pt will improve AAROM to > or = to 120 deg flex and scap as measured on pulleys for ease w/ dressing. At PN: Goal met: AAROM flexion and scaption 125 deg with pulleys. 6/14/18 Long Term Goals: To be accomplished in 8 weeks: 1. Pt will improve FOTO to > or = to 63 to demo improved function. At PN: Met, FOTO = 63, 7/26/2018 2. 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 At PN: Progressing, Flexion AROM 74 deg, Scaption AROM 55 deg, 8/24/2018 Current: Remains, Flexion AROM 77 degrees, Scaption AROM 55 degrees, 9/14/2018 3. Pt will improve AROM right shoulder functional ER to > or = to occiput for ease putting on her shirt. At PN: Remains, Left Shoulder Functional ER = side of head, 8/24/2018 Current: Remains, Left Shoulder Functional ER = Left Ear, 9/14/2018 4. Pt will improve MMT left shoulder to > or = to -4-4/5 grossly for ease w/ return to unrestricted ADLs. At PN: Progressing, Flexion 3-/5, Abduction 3-/5, IR 5/5, ER 5/5, 8/24/2018 Current: Remains, Flexion 3-/5, Abduction 3-/5, IR 5/5, ER 5/5, 9/14/2018 PLAN 
[]  Upgrade activities as tolerated     []  Continue plan of care 
[]  Update interventions per flow sheet [x]  Discharge due to:_ 
[]  Other:_   
 
Zoey Magaña, PT 9/14/2018  8:19 AM 
 
Future Appointments Date Time Provider Claire Johnson 9/14/2018 9:00 AM Zoey Magaña, PT [facility]  
10/5/2018 9:00 AM Zoey Magaña, PT [facility]

## 2018-10-05 ENCOUNTER — APPOINTMENT (OUTPATIENT)
Dept: PHYSICAL THERAPY | Age: 68
End: 2018-10-05

## 2018-11-05 ENCOUNTER — TELEPHONE (OUTPATIENT)
Dept: ORTHOPEDIC SURGERY | Age: 68
End: 2018-11-05

## 2018-11-05 NOTE — TELEPHONE ENCOUNTER
Pt is calling to speak with Dr. Shelby Ramirez nurse. Pt gave no further information. Please advise pt at [phone].

## 2018-11-06 NOTE — TELEPHONE ENCOUNTER
Call patient. She does not need to be seen unless she is having LBP/sciatica, etc.    Reviewed her 1 lumbar MRI. Scoliosis generally does not effect hip sx. Sometimes they can even correct a minor leg length discrepancy that is commonly seen with scoliosis and help with SI joint pain. I don't know which surgeon she saw. Dr. Harmeet Morse is a good 2nd opinion, he does only hips and knees. He was an  before he became a doctor.

## 2018-11-06 NOTE — TELEPHONE ENCOUNTER
Called patient and verified . Patients ortho suggested hip replacement and she said she agrees but, wants to know your advice on having a hip replacement because of her severe scoliosis and whether it would be a good idea, does her ortho surgeon need to know anything specific about her condition in order to make a better decision and patient would also like a recommendation on a ortho surgeon who is possibly experienced with her situation and for a 2nd opinion. Patient doesn't mind coming in to be seen and discuss but would like to come in sooner rather than later. Please advise.

## 2018-11-06 NOTE — TELEPHONE ENCOUNTER
Called patient and verified . Patient was informed of Dr. Angelina Paul message \"She does not need to be seen unless she is having LBP/sciatica, etc. Reviewed her 2015 lumbar MRI. Scoliosis generally does not effect hip sx. Sometimes they can even correct a minor leg length discrepancy that is commonly seen with scoliosis and help with SI joint pain. I don't know which surgeon she saw. Dr. Joan Ramirez is a good 2nd opinion, he does only hips and knees. He was an  before he became a doctor\". Patient verbalized understanding and no further questions or concerns at this time.

## 2018-12-26 ENCOUNTER — HOSPITAL ENCOUNTER (OUTPATIENT)
Dept: PHYSICAL THERAPY | Age: 68
Discharge: HOME | End: 2018-12-26
Payer: MEDICARE

## 2018-12-26 PROCEDURE — 97116 GAIT TRAINING THERAPY: CPT

## 2018-12-26 PROCEDURE — 97110 THERAPEUTIC EXERCISES: CPT

## 2018-12-26 PROCEDURE — 97162 PT EVAL MOD COMPLEX 30 MIN: CPT

## 2018-12-26 PROCEDURE — G8978 MOBILITY CURRENT STATUS: HCPCS

## 2018-12-26 PROCEDURE — G8979 MOBILITY GOAL STATUS: HCPCS

## 2018-12-26 NOTE — PROGRESS NOTES
[facility]              [address]        [place], [address] [place]   [phone] fax    Plan of Care/ Statement of Necessity for Physical Therapy Services    Patient name: Jorge Luis Rondon Start of Care: 2018   Referral source: Alvin Degroot, * : 1950    Medical Diagnosis: Left hip pain [M25.552]  Payor: Usha Barrera / Plan: [place] MED[place] & B / Product Type: Medicare /  Onset Date:DoS 2018    Treatment Diagnosis: Left Hip Weakness   Prior Hospitalization: see medical history Provider#: [id number]   Medications: Verified on Patient summary List    Comorbidities: Right TKA (), Left TKA (), Left Hip SCFE (Surgery x3 - Childhood), Left LE LLD (left LE shorter- Corrected with left FRANCK), Arthritis, Back Pain, BMI>30, HTN   Prior Level of Function: Ambulation without AD, (I) Functional ADLs, Retired, [address] (2-3x/week - UE/LE Resistance Regime), Cardio/Mauro Chi (2-3x/week), (I) Functional ADLs, (I) Driving      The Plan of Care and following information is based on the information from the initial evaluation. 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 Patient as well with PMH significant for left TKA () and right TKA () with patient reporting full functional recovery achieved. Patient presents with an uncomplicated post-surgical recovery with patient denying red flag s/s and subjectively reporting discharge from home health physical therapy 2018.  Patient does report development of hematoma to left anterolateral hip 4 days post-surgery with patient reporting appropriate healing with surgical incision visualized with black scabbing noted to medial 1/3 of incision but no active nor dry drainage visualized. Patient does report burning/numbness/tingling localized to the left anterolateral thigh post-surgery with improvement since onset. Diminished sensation noted to region objectively. Patient ambulates with SPC, with use reported with long-distance ambulation, with gait without AD significant for decreased left stance phase time with decreased hip extension during terminal stance phase. Patient noted with diminished SLS stability bilaterally with subjective report of generalized fatigue with resultant ambulation tolerance of 30 minutes. Patient demonstrates hypertonicity of the left quadriceps/hip flexor musculature with weakness of the gluteal musculature demonstrated. Emphasis of within clinic treatment to be placed on improving muscular asymmetries with functional emphasis to improve ease and tolerance with functional ADLs and prolonged ambulation. Patient with desire to return to previous exercise regime with completion of UE/LE resistance and elliptical/mary chi 2-3x/week.      Patient will continue to benefit from skilled PT services to modify and progress therapeutic interventions, address functional mobility deficits, address ROM deficits, address strength deficits, analyze and address soft tissue restrictions, analyze and cue movement patterns and analyze and modify body mechanics/ergonomics to attain remaining goals. Key Information:    BP: 140/86 mmHg  Posture/Observation: Slight unweighting of the left LE     Gait:    Decreased left stance phase time with decreased left hip extension during terminal stance phase     Neurovascular Screen: L/R Posterior tibialis 0+, L/R Dorsalis pedis 1+, Decreased sensation to left anterolateral thigh  Surgical Incision: Black scabbing noted to medial 1/3 of incision without s/s consistent with infection nor active/dried drainage     Functional Tests:  1.  Stairs: Step-over-step gait pattern with bilateral UE assist with ascent with step-to gait pattern with bilateral UE assist with descent  2. Rhomberg: EO 30 sec / EC 30 sec  3. SLS: Left 4 sec / Right 15 sec  4. 30-sec-sit-to-stand: 9 repetitions (no UE assistance, uweighting of left LE)                                 ROM/Strength          AROM                          MMT (1-5)  Hip Left Right Left Right   Flexion 110 110 4 5   Extension     2+ 4+   Abduction     3+ 5   ER     3+ 5   IR     3+ 5   Knee Left Right Left Right   Extension     5 5   Flexion     5 5   Ankle DF: Left 5/5, Right 5/5  *Left hip IR/ER tested in neutral rotation     Muscle Flexibility:              Prone Quadriceps: Left (severe) / Right (minimal)    Evaluation Complexity History MEDIUM  Complexity : 1-2 comorbidities / personal factors will impact the outcome/ POC ; Examination MEDIUM Complexity : 3 Standardized tests and measures addressing body structure, function, activity limitation and / or participation in recreation  ;Presentation MEDIUM Complexity : Evolving with changing characteristics  ; Clinical Decision Making MEDIUM Complexity : FOTO score of 26-74  Overall Complexity Rating: MEDIUM  Problem List: pain affecting function, decrease ROM, decrease strength, edema affecting function, impaired gait/ balance, decrease ADL/ functional abilitiies, decrease activity tolerance, decrease flexibility/ joint mobility and decrease transfer abilities   Treatment Plan may include any combination of the following: Therapeutic exercise, Therapeutic activities, Neuromuscular re-education, Physical agent/modality, Gait/balance training, Manual therapy, Patient education, Self Care training, Functional mobility training, Home safety training and Stair training  Patient / Family readiness to learn indicated by: asking questions, trying to perform skills and interest  Persons(s) to be included in education: patient (P)  Barriers to Learning/Limitations: None  Patient Goal (s):  \"I want to make sure I am walking correctly and strengthening my leg\"  Patient Self Reported Health Status: good  Rehabilitation Potential: good    Short Term Goals: To be accomplished in 3 weeks: 1. Patient will subjectively report full compliance with prescribed HEP. 2. Patient will demonstrate left hip abduction MMT >/=4+/5 to improve ease with ambulation on uneven surfaces. 3. Patient will demonstrate left hip extension MMT >/=4+/5 to improve ease with curb management. Long Term Goals: To be accomplished in 6-8 weeks:  1. Patient will demonstrate a significant functional improvement as demonstrated by a score of >/= 70 on FOTO. 2. Patient will demonstrate left/right SLS >/=30 seconds to demonstrate a reduced falls risk. 3. Patient will subjectively report ambulation tolerance >/=60 minutes to improve ease with household ADLs. Frequency / Duration: Patient to be seen 2 times per week for 8 weeks. Patient/ Caregiver education and instruction: Diagnosis, prognosis, self care, activity modification and exercises   [x]  Plan of care has been reviewed with PTA    G-Codes (GP)  Mobility   Current  CK= 40-59%   Goal  CJ= 20-39%    The severity rating is based on clinical judgment and the FOTO score. Certification Period: 12/26/2018 - 2/24/2018  Essie Bess, PT 12/26/2018 12:23 PM  ________________________________________________________________________    I certify that the above Therapy Services are being furnished while the patient is under my care. I agree with the treatment plan and certify that this therapy is necessary.     Physician's Signature:____________Date:_________TIME:________    ** Signature, Date and Time must be completed for valid certification **  Please sign and return to [facility]/ Jarod Wilks [address]              [address] [facility], [address] [place]   [phone] fax

## 2018-12-26 NOTE — PROGRESS NOTES
PT DAILY TREATMENT NOTE 10-18    Patient Name: Penny Thorne  Date:2018  : 1950  [x]  Patient  Verified  Payor: [place] MEDICARE / Plan: [place] MEDICARE PART A & B / Product Type: Medicare /    In time:155  Out time:242  Total Treatment Time (min): 52  Visit #: 1 of 16    Medicare/BCBS Only   Total Timed Codes (min):  24 1:1 Treatment Time:  47       Treatment Area: Left hip pain [M25.552]    Physical Therapy Evaluation - Hip    SUBJECTIVE      Any medication changes, allergies to medications, adverse drug reactions, diagnosis change, or new procedure performed?: [x] No    [] Yes (see summary sheet for update)    Subjective functional status/changes:     PLOF: Ambulation without AD, (I) Functional ADLs, Retired, Workout (2-3x/week - UE/LE Resistance Regime), Cardio/Mauro Chi (2-3x/week), (I) Functional ADLs, (I) Driving  Current Functional Status: SPC (use with long-distance ambulation), (I) Driving, Fatigue, Ambulation Tolerance (30 min)  Work Hx: Retired  Living Situation: Lives in [facility] (bedroom on first floor) - Lives with   Comorbidities: Right TKA (), Left TKA (), Left Hip SCFE (Surgery x3 - Childhood), Left LE LLD (left LE shorter- Corrected with left FRANCK), Arthritis, Back Pain, BMI>30, HTN  Medications: Tylenol (PRN)    Pain Intensity (0-10, VAS): Current 2, Worst 5, Best 0    Patient Goals: \"I want to make sure I am walking correctly and strengthening my leg\"    OBJECTIVE    BP: 140/86 mmHg  Posture/Observation: Slight unweighting of the left LE    Gait: Decreased left stance phase time with decreased left hip extension during terminal stance phase    Neurovascular Screen: L/R Posterior tibialis 0+, L/R Dorsalis pedis 1+, Decreased sensation to left anterolateral thigh  Surgical Incision: Black scabbing noted to medial 1/3 of incision without s/s consistent with infection nor active/dried drainage    Functional Tests:  1.  Stairs: Step-over-step gait pattern with bilateral UE assist with ascent with step-to gait pattern with bilateral UE assist with descent  2. Rhomberg: EO 30 sec / EC 30 sec  3. SLS: Left 4 sec / Right 15 sec  4. 30-sec-sit-to-stand: 9 repetitions (no UE assistance, uweighting of left LE)             ROM/Strength    AROM           MMT (1-5)  Hip Left Right Left Right   Flexion 110 110 4 5   Extension   2+ 4+   Abduction   3+ 5   ER   3+ 5   IR   3+ 5   Knee Left Right Left Right   Extension   5 5   Flexion   5 5   Ankle DF: Left 5/5, Right 5/5  *Left hip IR/ER tested in neutral rotation    Muscle Flexibility:   Prone Quadriceps: Left (severe) / Right (minimal)    OBJECTIVE    23 min [x]Eval                  []Re-Eval     8 min Therapeutic Exercise:  [x] See flow sheet : Patient educated regarding completion of prescribed HEP and provided with written HEP instructions, Patient educated regarding diagnosis and PT PoC   Rationale: increase ROM and increase strength to improve the patients ability to improve ease stair management    8 min Therapeutic Activity:  [x]  See flow sheet : Discussion regarding modification of functional household and self-care ADLs with discussion regarding safety   Rationale: increase ROM and increase strength  to improve the patients ability to improve safety with community ambulation     8 min Gait Trainin. Gait Training x40': Review of gait mechanics without use of AD with emphasis placed on a symmetrical stride length with an increase in left LE stance phase time. 2. Stair Training x4 steps: Emphasis placed on LE symmetry and safety with completion with verbal cuing to correct lateral trunk lean   Rationale: Performance to reduce risk of falls.           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 Patient as well with PMH significant for left TKA (2007) and right TKA (2015) with patient reporting full functional recovery achieved. Patient presents with an uncomplicated post-surgical recovery with patient denying red flag s/s and subjectively reporting discharge from home health physical therapy 12/21/2018. Patient does report development of hematoma to left anterolateral hip 4 days post-surgery with patient reporting appropriate healing with surgical incision visualized with black scabbing noted to medial 1/3 of incision but no active nor dry drainage visualized. Patient does report burning/numbness/tingling localized to the left anterolateral thigh post-surgery with improvement since onset. Diminished sensation noted to region objectively. Patient ambulates with SPC, with use reported with long-distance ambulation, with gait without AD significant for decreased left stance phase time with decreased hip extension during terminal stance phase. Patient noted with diminished SLS stability bilaterally with subjective report of generalized fatigue with resultant ambulation tolerance of 30 minutes. Patient demonstrates hypertonicity of the left quadriceps/hip flexor musculature with weakness of the gluteal musculature demonstrated. Emphasis of within clinic treatment to be placed on improving muscular asymmetries with functional emphasis to improve ease and tolerance with functional ADLs and prolonged ambulation. Patient with desire to return to previous exercise regime with completion of UE/LE resistance and elliptical/mary chi 2-3x/week.      Patient will continue to benefit from skilled PT services to modify and progress therapeutic interventions, address functional mobility deficits, address ROM deficits, address strength deficits, analyze and address soft tissue restrictions, analyze and cue movement patterns and analyze and modify body mechanics/ergonomics to attain remaining goals. [x]  See Plan of Care  []  See progress note/recertification  []  See Discharge Summary         Progress towards goals / Updated goals:    Short Term Goals: To be accomplished in 3 weeks: 1. Patient will subjectively report full compliance with prescribed HEP. Eval: HEP provided  2. Patient will demonstrate left hip abduction MMT >/=4+/5 to improve ease with ambulation on uneven surfaces. Eval: Left Hip Abduction MMT = 3+/5  3. Patient will demonstrate left hip extension MMT >/=4+/5 to improve ease with curb management. Eval: Left Hip Extension MMT = 2+/5    Long Term Goals: To be accomplished in 6-8 weeks:  1. Patient will demonstrate a significant functional improvement as demonstrated by a score of >/= 70 on FOTO. Eval: FOTO = 58  2. Patient will demonstrate left/right SLS >/=30 seconds to demonstrate a reduced falls risk. Eval: Left SLS = 4 sec / Right SLS = 15 sec  3. Patient will subjectively report ambulation tolerance >/=60 minutes to improve ease with household ADLs.   Eval: Ambulation Tolerance = 30 min    PLAN  [x]  Upgrade activities as tolerated     []  Continue plan of care  []  Update interventions per flow sheet       []  Discharge due to:_  []  Other:_      Elisha Pérez, PT 12/26/2018  12:22 PM    Future Appointments   Date Time Provider Claire Johnson   12/26/2018  2:00 PM Wilhemenia Cough, PT [facility]

## 2019-01-03 ENCOUNTER — HOSPITAL ENCOUNTER (OUTPATIENT)
Dept: PHYSICAL THERAPY | Age: 69
Discharge: HOME | End: 2019-01-03
Payer: MEDICARE

## 2019-01-03 PROCEDURE — 97112 NEUROMUSCULAR REEDUCATION: CPT

## 2019-01-03 PROCEDURE — 97110 THERAPEUTIC EXERCISES: CPT

## 2019-01-03 NOTE — PROGRESS NOTES
PT DAILY TREATMENT NOTE 10-18 Patient Name: Uday Pitts Date:1/3/2019 : 1950 [x]  Patient  Verified Payor: VA MEDICARE / Plan: Alex Kumar / Product Type: Medicare / In time:1155 Out time:1235 Total Treatment Time (min): 40 Visit #: 2 of 16 Medicare/BCBS Only Total Timed Codes (min):  40 1:1 Treatment Time:  40 Treatment Area: Left hip pain [M25.552] SUBJECTIVE Pain Level (0-10 scale): 1 Any medication changes, allergies to medications, adverse drug reactions, diagnosis change, or new procedure performed?: [x] No    [] Yes (see summary sheet for update) Subjective functional status/changes:   [] No changes reported Pt reports that she is having minimal pain. Notes she has noticed a significant improvement after performing HEP for 5 days. OBJECTIVE 15 min Therapeutic Exercise:  [x] See flow sheet :  
Rationale: increase ROM and increase strength to improve the patients ability to increase ease with  
  
25 min Neuromuscular Re-education:  [x]  See flow sheet : dynamic gait training and standing balance exercises Rationale: increase strength, improve coordination, improve balance and increase proprioception  to improve the patients ability to increase ease with community mobility With 
 [] TE 
 [] TA 
 [] neuro 
 [] other: Patient Education: [x] Review HEP [] Progressed/Changed HEP based on:  
[] positioning   [] body mechanics   [] transfers   [] heat/ice application   
[] other:   
 
 
Pain Level (0-10 scale) post treatment: 2-3 (soreness) ASSESSMENT/Changes in Function: initiated exercises per POC. Pt tolerated today's session well without any increase in pain.    
 
Patient will continue to benefit from skilled PT services to modify and progress therapeutic interventions, address functional mobility deficits, address ROM deficits, address strength deficits, analyze and address soft tissue restrictions, analyze and cue movement patterns, analyze and modify body mechanics/ergonomics, assess and modify postural abnormalities and instruct in home and community integration to attain remaining goals. [x]  See Plan of Care 
[]  See progress note/recertification 
[]  See Discharge Summary Progress towards goals / Updated goals: 
Short Term Goals: To be accomplished in 3 weeks: 1. Patient will subjectively report full compliance with prescribed HEP. Eval: HEP provided Current: MET, reports performing HEP daily, 1/3/19 2. Patient will demonstrate left hip abduction MMT >/=4+/5 to improve ease with ambulation on uneven surfaces. Eval: Left Hip Abduction MMT = 3+/5 3. Patient will demonstrate left hip extension MMT >/=4+/5 to improve ease with curb management. Eval: Left Hip Extension MMT = 2+/5 
  
Long Term Goals: To be accomplished in 6-8 weeks: 1. Patient will demonstrate a significant functional improvement as demonstrated by a score of >/= 70 on FOTO. Eval: FOTO = 58 
2. Patient will demonstrate left/right SLS >/=30 seconds to demonstrate a reduced falls risk. Eval: Left SLS = 4 sec / Right SLS = 15 sec 3. Patient will subjectively report ambulation tolerance >/=60 minutes to improve ease with household ADLs. Eval: Ambulation Tolerance = 30 min PLAN [x]  Upgrade activities as tolerated     [x]  Continue plan of care 
[]  Update interventions per flow sheet      
[]  Discharge due to:_ 
[]  Other:_ Mayte Dockery 1/3/2019  11:53 AM 
 
Future Appointments Date Time Provider Claire Johnson 1/3/2019 12:00 PM Bethany Dallas [facility]  
1/9/2019  9:30 AM Lama So, PTA [facility]  
1/11/2019 10:00 AM Lama So, PTA [facility]  
1/16/2019  9:30 AM Lama So, PTA [facility]  
1/18/2019 10:00 AM Lama So, PTA [facility]  
1/22/2019 11:30 AM Laurie Lisa, PT [facility]  
1/24/2019 10:00 AM Lama So, PTA [facility]  
 1/29/2019  8:30 AM Salinas Duvall, PT [facility]  
1/31/2019 10:00 AM Salinas Duvall, PT [facility]  
2/5/2019  9:00 AM Salinas Duvall, PT [facility]  
2/7/2019  9:00 AM Farideh Dominguez, PTA [facility]  
2/12/2019  9:00 AM Salinas Duvall, PT [facility]  
2/14/2019  9:00 AM Farideh Dominguez, PTA [facility]

## 2019-01-09 ENCOUNTER — HOSPITAL ENCOUNTER (OUTPATIENT)
Dept: PHYSICAL THERAPY | Age: 69
Discharge: HOME | End: 2019-01-09
Payer: MEDICARE

## 2019-01-09 PROCEDURE — 97112 NEUROMUSCULAR REEDUCATION: CPT

## 2019-01-09 PROCEDURE — 97110 THERAPEUTIC EXERCISES: CPT

## 2019-01-09 NOTE — PROGRESS NOTES
PT DAILY TREATMENT NOTE 10-18 Patient Name: Giana Gray Date:2019 : 1950 [x]  Patient  Verified Payor: [place] MEDICARE / Plan: Alex Dawsony / Product Type: Medicare / In time:9:22  Out time:9:50 Total Treatment Time (min): 28 Visit #: 3 of 16 Medicare/BCBS Only Total Timed Codes (min):  28 1:1 Treatment Time:  28 Treatment Area: Left hip pain [M25.552] SUBJECTIVE Pain Level (0-10 scale): 1 Any medication changes, allergies to medications, adverse drug reactions, diagnosis change, or new procedure performed?: [x] No    [] Yes (see summary sheet for update) Subjective functional status/changes:   [] No changes reported Pt reports that at night she has stiffness and burning in her thigh and leg. OBJECTIVE Min Type Additional Details  
 [] Estim:  []Unatt       []IFC  []Premod []Other:  []w/ice   []w/heat Position: Location:  
 [] Estim: []Att    []TENS instruct  []NMES []Other:  []w/US   []w/ice   []w/heat Position: Location:  
 []  Traction: [] Cervical       []Lumbar 
                     [] Prone          []Supine []Intermittent   []Continuous Lbs: 
[] before manual 
[] after manual  
 []  Ultrasound: []Continuous   [] Pulsed []1MHz   []3MHz W/cm2: 
Location:  
 []  Iontophoresis with dexamethasone Location: [] Take home patch  
[] In clinic  
 []  Ice     []  heat 
[]  Ice massage 
[]  Laser  
[]  Anodyne Position: Location:  
 []  Laser with stim 
[]  Other:  Position: Location:  
 []  Vasopneumatic Device Pressure:       [] lo [] med [] hi  
Temperature: [] lo [] med [] hi  
[] Skin assessment post-treatment:  []intact []redness- no adverse reaction 
  []redness  adverse reaction:  
 
 
18 min Therapeutic Exercise:  [x] See flow sheet :  
Rationale: increase ROM and increase strength to improve the patients ability to increase tolerance to activities. 10 min Neuromuscular Re-education:  [x]  See flow sheet :balance. Rationale: increase ROM, increase strength, improve coordination and improve balance  to improve the patients ability to increase ease with ADLs. With 
 [] TE 
 [] TA 
 [] neuro 
 [] other: Patient Education: [x] Review HEP [] Progressed/Changed HEP based on:  
[] positioning   [] body mechanics   [] transfers   [] heat/ice application   
[] other:   
 
Other Objective/Functional Measures:   
 
Pain Level (0-10 scale) post treatment: 1 ASSESSMENT/Changes in Function: Initiated hip three way to initiate balance exercises and increase standing tolerance on left LE. Patient will continue to benefit from skilled PT services to modify and progress therapeutic interventions, address functional mobility deficits, address ROM deficits, address strength deficits and analyze and address soft tissue restrictions to attain remaining goals. []  See Plan of Care 
[]  See progress note/recertification 
[]  See Discharge Summary Progress towards goals / Updated goals: 
Short Term Goals: To be accomplished in 3 weeks: 1. Patient will subjectively report full compliance with prescribed HEP. Eval: HEP provided Current: MET, reports performing HEP daily, 1/3/19 2. Patient will demonstrate left hip abduction MMT >/=4+/5 to improve ease with ambulation on uneven surfaces. Eval: Left Hip Abduction MMT = 3+/5 Current: Progressing: left hip abduction MMT = 4/5. 1/9/19 3. Patient will demonstrate left hip extension MMT >/=4+/5 to improve ease with curb management. Eval: Left Hip Extension MMT = 2+/5 
  
Long Term Goals: To be accomplished in 6-8 weeks: 1. Patient will demonstrate a significant functional improvement as demonstrated by a score of >/= 70 on FOTO. Eval: FOTO = 58 
2. Patient will demonstrate left/right SLS >/=30 seconds to demonstrate a reduced falls risk. Eval: Left SLS = 4 sec / Right SLS = 15 sec 3. Patient will subjectively report ambulation tolerance >/=60 minutes to improve ease with household ADLs. Eval: Ambulation Tolerance = 30 min PLAN 
[]  Upgrade activities as tolerated     [x]  Continue plan of care 
[]  Update interventions per flow sheet      
[]  Discharge due to:_ 
[]  Other:_ Rhoda Janice, PTA 1/9/2019  9:22 AM 
 
Future Appointments Date Time Provider Claire Johnson 1/9/2019  9:30 AM Sherif Berkley, PTA [facility]  
1/11/2019 10:00 AM Sherif Berkley, PTA [facility]  
1/16/2019  9:30 AM Sherif Berkley, PTA [facility]  
1/18/2019 10:00 AM Sherif Berkley, PTA [facility]  
1/22/2019 11:30 AM Patrick Kirkpatrick, PT [facility]  
1/24/2019 10:00 AM Sherif Berkley, PTA [facility]  
1/29/2019  8:30 AM Patrick Kirkpatrick, PT [facility]  
1/31/2019 10:00 AM Patrick Kirkpatrick, PT [facility]  
2/5/2019  9:00 AM Patrick Kirkpatrick, PT [facility]  
2/7/2019  9:00 AM Sherif Berkley, PTA [facility]  
2/12/2019  9:00 AM Patrick Kirkpatrick, PT [facility]  
2/14/2019  9:00 AM Sherif Berkley, PTA [facility]

## 2019-01-11 ENCOUNTER — HOSPITAL ENCOUNTER (OUTPATIENT)
Dept: PHYSICAL THERAPY | Age: 69
Discharge: HOME | End: 2019-01-11
Payer: MEDICARE

## 2019-01-11 PROCEDURE — 97112 NEUROMUSCULAR REEDUCATION: CPT

## 2019-01-11 PROCEDURE — 97110 THERAPEUTIC EXERCISES: CPT

## 2019-01-16 ENCOUNTER — HOSPITAL ENCOUNTER (OUTPATIENT)
Dept: PHYSICAL THERAPY | Age: 69
Discharge: HOME | End: 2019-01-16
Payer: MEDICARE

## 2019-01-16 PROCEDURE — 97112 NEUROMUSCULAR REEDUCATION: CPT

## 2019-01-16 PROCEDURE — 97110 THERAPEUTIC EXERCISES: CPT

## 2019-01-16 NOTE — PROGRESS NOTES
PT DAILY TREATMENT NOTE 10-18 Patient Name: Dawood Zepeda Date:2019 : 1950 [x]  Patient  Verified Payor: [place] MEDICARE / Plan: Alex Kumar / Product Type: Medicare / In time:9:25  Out time:10:05 Total Treatment Time (min): 40 Visit #: 5 of 16 Medicare/BCBS Only Total Timed Codes (min):  40 1:1 Treatment Time:  40 Treatment Area: Left hip pain [M25.552] SUBJECTIVE Pain Level (0-10 scale): 1-2 Any medication changes, allergies to medications, adverse drug reactions, diagnosis change, or new procedure performed?: [x] No    [] Yes (see summary sheet for update) Subjective functional status/changes:   [] No changes reported Pt reports she feels stiff after her 40 min drive here this morning. OBJECTIVE Min Type Additional Details  
 [] Estim:  []Unatt       []IFC  []Premod []Other:  []w/ice   []w/heat Position: Location:  
 [] Estim: []Att    []TENS instruct  []NMES []Other:  []w/US   []w/ice   []w/heat Position: Location:  
 []  Traction: [] Cervical       []Lumbar 
                     [] Prone          []Supine []Intermittent   []Continuous Lbs: 
[] before manual 
[] after manual  
 []  Ultrasound: []Continuous   [] Pulsed []1MHz   []3MHz W/cm2: 
Location:  
 []  Iontophoresis with dexamethasone Location: [] Take home patch  
[] In clinic  
 []  Ice     []  heat 
[]  Ice massage 
[]  Laser  
[]  Anodyne Position: Location:  
 []  Laser with stim 
[]  Other:  Position: Location:  
 []  Vasopneumatic Device Pressure:       [] lo [] med [] hi  
Temperature: [] lo [] med [] hi  
[] Skin assessment post-treatment:  []intact []redness- no adverse reaction 
  []redness  adverse reaction:  
  
  
30 min Therapeutic Exercise:  [x] See flow sheet :  
Rationale: increase ROM and increase strength to improve the patients ability to increase tolerance to activities.  
  
10 min Neuromuscular Re-education:  [x]  See flow sheet :balance.   
Rationale: increase ROM, increase strength, improve coordination and improve balance  to improve the patients ability to increase ease with ADLs.   
  
  
 
 
     
With 
 [] TE 
 [] TA 
 [] neuro 
 [] other: Patient Education: [x] Review HEP [] Progressed/Changed HEP based on:  
[] positioning   [] body mechanics   [] transfers   [] heat/ice application   
[] other:   
 
Other Objective/Functional Measures:   
 
Pain Level (0-10 scale) post treatment: 2 
 
ASSESSMENT/Changes in Function: Pt reports that most of the limitation she has with ambulation is with her back pain. Patient will continue to benefit from skilled PT services to modify and progress therapeutic interventions, address functional mobility deficits, address ROM deficits, address strength deficits and analyze and address soft tissue restrictions to attain remaining goals. []  See Plan of Care 
[]  See progress note/recertification 
[]  See Discharge Summary Progress towards goals / Updated goals: 
Short Term Goals: To be accomplished in 3 weeks: 1. Patient will subjectively report full compliance with prescribed HEP. Eval: HEP provided Current: MET, reports performing HEP daily, 1/3/19 2. Patient will demonstrate left hip abduction MMT >/=4+/5 to improve ease with ambulation on uneven surfaces. Eval: Left Hip Abduction MMT = 3+/5 Current: Progressing: left hip abduction MMT = 4/5. 1/9/19 3. Patient will demonstrate left hip extension MMT >/=4+/5 to improve ease with curb management. Eval: Left Hip Extension MMT = 2+/5 
  
Long Term Goals: To be accomplished in 6-8 weeks: 1. Patient will demonstrate a significant functional improvement as demonstrated by a score of >/= 70 on FOTO. Eval: FOTO = 58 
2. Patient will demonstrate left/right SLS >/=30 seconds to demonstrate a reduced falls risk. Eval: Left SLS = 4 sec / Right SLS = 15 sec 3. Patient will subjectively report ambulation tolerance >/=60 minutes to improve ease with household ADLs. Eval: Ambulation Tolerance = 30 min Current: Not met: ambulation tolerance = 20 mins. 1/16/19 
  
  
  
 
 
 
PLAN 
[]  Upgrade activities as tolerated     [x]  Continue plan of care 
[]  Update interventions per flow sheet      
[]  Discharge due to:_ 
[]  Other:_ Chase Patricio PTA 1/16/2019  9:28 AM 
 
Future Appointments Date Time Provider Claire Johnson 1/16/2019  9:30 AM Josh Barroso PTA [facility]  
1/18/2019 10:00 AM Josh Barroso PTA [facility]  
1/22/2019 11:30 AM Williams Vincent, PT [facility]  
1/24/2019 10:00 AM Josh Barroso PTA [facility]  
1/29/2019  8:30 AM Williams Vincent, PT [facility]  
1/31/2019 10:00 AM Williams Vincent, PT [facility]  
2/5/2019  9:00 AM Williams Vincent, PT [facility]  
2/7/2019  9:00 AM Josh Barroso, PTA [facility]  
2/12/2019  9:00 AM [facility], [facility]  
2/14/2019  9:00 AM Josh Barroso, PTA [facility]

## 2019-01-18 ENCOUNTER — HOSPITAL ENCOUNTER (OUTPATIENT)
Dept: PHYSICAL THERAPY | Age: 69
Discharge: HOME | End: 2019-01-18
Payer: MEDICARE

## 2019-01-18 PROCEDURE — 97112 NEUROMUSCULAR REEDUCATION: CPT

## 2019-01-18 PROCEDURE — 97110 THERAPEUTIC EXERCISES: CPT

## 2019-01-18 NOTE — PROGRESS NOTES
PT DAILY TREATMENT NOTE 10-18 Patient Name: Dawood Zepeda Date:2019 : 1950 [x]  Patient  Verified Payor: [place] MEDICARE / Plan: Alex Kumar / Product Type: Medicare / In time:10:02  Out time:10:44 Total Treatment Time (min): 42 Visit #: 6 of 16 Medicare/BCBS Only Total Timed Codes (min):  42 1:1 Treatment Time:  42 Treatment Area: Left hip pain [M25.552] SUBJECTIVE Pain Level (0-10 scale): 0 Any medication changes, allergies to medications, adverse drug reactions, diagnosis change, or new procedure performed?: [x] No    [] Yes (see summary sheet for update) Subjective functional status/changes:   [] No changes reported Pt reports she went to the doctor this morning and she told him that her back is bothering her more than her hip. OBJECTIVE Min Type Additional Details  
 [] Estim:  []Unatt       []IFC  []Premod []Other:  []w/ice   []w/heat Position: Location:  
 [] Estim: []Att    []TENS instruct  []NMES []Other:  []w/US   []w/ice   []w/heat Position: Location:  
 []  Traction: [] Cervical       []Lumbar 
                     [] Prone          []Supine []Intermittent   []Continuous Lbs: 
[] before manual 
[] after manual  
 []  Ultrasound: []Continuous   [] Pulsed []1MHz   []3MHz W/cm2: 
Location:  
 []  Iontophoresis with dexamethasone Location: [] Take home patch  
[] In clinic  
 []  Ice     []  heat 
[]  Ice massage 
[]  Laser  
[]  Anodyne Position: Location:  
 []  Laser with stim 
[]  Other:  Position: Location:  
 []  Vasopneumatic Device Pressure:       [] lo [] med [] hi  
Temperature: [] lo [] med [] hi  
[] Skin assessment post-treatment:  []intact []redness- no adverse reaction 
  []redness  adverse reaction:  
 
  
  
32 min Therapeutic Exercise:  [x] See flow sheet :  
 Rationale: increase ROM and increase strength to improve the patients ability to increase tolerance to activities.  
  
10 min Neuromuscular Re-education:  [x]  See flow sheet :balance.   
Rationale: increase ROM, increase strength, improve coordination and improve balance  to improve the patients ability to increase ease with ADLs.   
  
 
 
 
       
With 
 [] TE 
 [] TA 
 [] neuro 
 [] other: Patient Education: [x] Review HEP [] Progressed/Changed HEP based on:  
[] positioning   [] body mechanics   [] transfers   [] heat/ice application   
[] other:   
 
Other Objective/Functional Measures:   
 
Pain Level (0-10 scale) post treatment: 1 ASSESSMENT/Changes in Function: Increased weight and resistance with exercises. Patient will continue to benefit from skilled PT services to modify and progress therapeutic interventions, address functional mobility deficits, address ROM deficits, address strength deficits and analyze and address soft tissue restrictions to attain remaining goals. []  See Plan of Care 
[]  See progress note/recertification 
[]  See Discharge Summary Progress towards goals / Updated goals: 
Short Term Goals: To be accomplished in 3 weeks: 1. Patient will subjectively report full compliance with prescribed HEP. Eval: HEP provided Current: MET, reports performing HEP daily, 1/3/19 2. Patient will demonstrate left hip abduction MMT >/=4+/5 to improve ease with ambulation on uneven surfaces. Eval: Left Hip Abduction MMT = 3+/5 Current: Progressing: left hip abduction MMT = 4/5. 1/9/19 3. Patient will demonstrate left hip extension MMT >/=4+/5 to improve ease with curb management. Eval: Left Hip Extension MMT = 2+/5 
  
Long Term Goals: To be accomplished in 6-8 weeks: 1. Patient will demonstrate a significant functional improvement as demonstrated by a score of >/= 70 on FOTO. Eval: FOTO = 58 
2.  Patient will demonstrate left/right SLS >/=30 seconds to demonstrate a reduced falls risk. Eval: Left SLS = 4 sec / Right SLS = 15 sec 3. Patient will subjectively report ambulation tolerance >/=60 minutes to improve ease with household ADLs. Eval: Ambulation Tolerance = 30 min Current: Not met: ambulation tolerance = 20 mins. 1/16/19 
  
 
 
 
PLAN 
[]  Upgrade activities as tolerated     [x]  Continue plan of care 
[]  Update interventions per flow sheet      
[]  Discharge due to:_ 
[]  Other:_ Connee Bones, PTA 1/18/2019  10:15 AM 
 
Future Appointments Date Time Provider Claire Johnson 1/22/2019 11:30 AM Gracie Ward, PT [facility]  
1/24/2019 10:00 AM Logan Braun, PTA [facility]  
1/29/2019  8:30 AM Gracie Ward, PT [facility]  
1/31/2019 10:00 AM Gracie Ward, PT [facility]  
2/5/2019  9:00 AM Gracie Ward, PT [facility]  
2/7/2019  9:00 AM Logan Braun, PTA [facility]  
2/12/2019  9:00 AM Gracie Ward, PT [facility]  
2/14/2019  9:00 AM Logan Braun, PTA [facility]

## 2019-01-22 ENCOUNTER — HOSPITAL ENCOUNTER (OUTPATIENT)
Dept: PHYSICAL THERAPY | Age: 69
Discharge: HOME | End: 2019-01-22
Payer: MEDICARE

## 2019-01-22 PROCEDURE — 97110 THERAPEUTIC EXERCISES: CPT

## 2019-01-22 PROCEDURE — 97112 NEUROMUSCULAR REEDUCATION: CPT

## 2019-01-22 NOTE — PROGRESS NOTES
PT DAILY TREATMENT NOTE 10-18 Patient Name: Sammy Rubin Date:2019 : 1950 [x]  Patient  Verified Payor: [place] MEDICARE / Plan: Alex Brunson y / Product Type: Medicare / In time:1130  Out time:1218 Total Treatment Time (min): 48 Visit #: 7 of 16 Medicare/BCBS Only Total Timed Codes (min):  48 1:1 Treatment Time:  25 Treatment Area: Left hip pain [M25.552] SUBJECTIVE Pain Level (0-10 scale): 3 Any medication changes, allergies to medications, adverse drug reactions, diagnosis change, or new procedure performed?: [x] No    [] Yes (see summary sheet for update) Subjective functional status/changes:   [] No changes reported Patient reports discomfort in lower back more prominent that left hip pain with patient contributing to change in leg length with surgery. OBJECTIVE 20 min Therapeutic Exercise:  _ See flow sheet : Emphasis placed on improving available hip AROM and LE strength Rationale: increase ROM and increase strength to improve the patients ability to improve ease with stair management. 28 min Neuromuscular Re-education:  [x]  See flow sheet : Emphasis placed on improving activation and recruitment of the gluteal and quadriceps musculature and improving LE proprioceptive and kinesthetic awareness Rationale: increase ROM, increase strength, improve balance and increase proprioception  to improve the patients ability to improve ease with functional lifting. With 
 [] TE 
 [] TA 
 [] neuro 
 [] other: Patient Education: [x] Review HEP [] Progressed/Changed HEP based on:  
[] positioning   [] body mechanics   [] transfers   [] heat/ice application   
[] other:   
 
Pain Level (0-10 scale) post treatment: 1 ASSESSMENT/Changes in Function: With patient demonstrating continued weakness of the hip abductors utilization of theraband resistance to increase activation of the gluteal musculature.  Question correlation of low back pain to change in leg length discrepancy post-surgery with patient educated in stretches to be performed to ease muscular tension. Patient will continue to benefit from skilled PT services to modify and progress therapeutic interventions, address functional mobility deficits, address ROM deficits, address strength deficits, analyze and address soft tissue restrictions and analyze and cue movement patterns to attain remaining goals. []  See Plan of Care 
[]  See progress note/recertification 
[]  See Discharge Summary Progress towards goals / Updated goals: 
 
Short Term Goals: To be accomplished in 3 weeks: 1. Patient will subjectively report full compliance with prescribed HEP. Eval: HEP provided Current: MET, reports performing HEP daily, 1/3/19 2. Patient will demonstrate left hip abduction MMT >/=4+/5 to improve ease with ambulation on uneven surfaces. Eval: Left Hip Abduction MMT = 3+/5 Current: Progressing, Left Hip Abduction MMT = 4/5, 1/22/2019 3. Patient will demonstrate left hip extension MMT >/=4+/5 to improve ease with curb management. Eval: Left Hip Extension MMT = 2+/5 Current: Progressing, Left Hip Extension MMT = 4/5, 1/22/2019 
  
Long Term Goals: To be accomplished in 6-8 weeks: 1. Patient will demonstrate a significant functional improvement as demonstrated by a score of >/= 70 on FOTO. Eval: FOTO = 58 
2. Patient will demonstrate left/right SLS >/=30 seconds to demonstrate a reduced falls risk. Eval: Left SLS = 4 sec / Right SLS = 15 sec 3. Patient will subjectively report ambulation tolerance >/=60 minutes to improve ease with household ADLs. Eval: Ambulation Tolerance = 30 min Current: Not met: ambulation tolerance = 20 mins. 1/16/19 PLAN [x]  Upgrade activities as tolerated     [x]  Continue plan of care 
[]  Update interventions per flow sheet      
[]  Discharge due to:_ 
[]  Other:_   
 
Raul Iraheta PT 1/22/2019  7:52 AM 
 
 Future Appointments Date Time Provider Claire Johnson 1/22/2019 11:30 AM Dustin Richardson, PT [facility] [address]  
1/24/2019 10:00 AM Linda Sequin, PTA [facility] [address]  
1/29/2019  8:30 AM Dustin Richardson, PT [facility] [address]  
1/31/2019 10:00 AM Dustin Richardson, PT [facility] [address]  
2/5/2019  9:00 AM Dustin Richardson, PT [facility] [address]  
2/7/2019  9:00 AM Linda Sequin, PTA [facility] [address]  
2/12/2019  9:00 AM Dustin Richardson, PT [facility] [address]  
2/14/2019  9:00 AM Linda Sequin, PTA [facility] [address]

## 2019-01-24 ENCOUNTER — HOSPITAL ENCOUNTER (OUTPATIENT)
Dept: PHYSICAL THERAPY | Age: 69
Discharge: HOME | End: 2019-01-24
Payer: MEDICARE

## 2019-01-24 PROCEDURE — G8979 MOBILITY GOAL STATUS: HCPCS

## 2019-01-24 PROCEDURE — 97112 NEUROMUSCULAR REEDUCATION: CPT

## 2019-01-24 PROCEDURE — 97110 THERAPEUTIC EXERCISES: CPT

## 2019-01-24 PROCEDURE — G8978 MOBILITY CURRENT STATUS: HCPCS

## 2019-01-24 NOTE — PROGRESS NOTES
[facility] [address] [facility] [place], [address]  
[phone] fax Medicare Progress Report Patient name: Wilfredo Hernandez Start of Care: 2019 Referral source: Divya Osuna, * : 1950 Medical/Treatment Diagnosis: Left hip pain [M25.552] Payor: Charles Morris / Plan: VA MEDICARE PART A & B / Product Type: Medicare /  Onset Date:DoS 2018 Prior Hospitalization: see medical history Provider#: [id number] Medications: Verified on Patient Summary List   
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  
 
Short Term Goals: To be accomplished in 3 weeks: 1. Patient will subjectively report full compliance with prescribed HEP. Eval: HEP provided At PN: MET, reports performing HEP daily 2. Patient will demonstrate left hip abduction MMT >/=4+/5 to improve ease with ambulation on uneven surfaces. Eval: Left Hip Abduction MMT = 3+/5 At PN: Progressing, Left Hip Abduction MMT = 4/5 3. Patient will demonstrate left hip extension MMT >/=4+/5 to improve ease with curb management. Eval: Left Hip Extension MMT = 2+/5 At PN: Progressing, Left Hip Extension MMT = 4/5 
  
Long Term Goals: To be accomplished in 6-8 weeks: 1. Patient will demonstrate a significant functional improvement as demonstrated by a score of >/= 70 on FOTO. Eval: FOTO = 58 At PN: Regressing, FOTO = 56 
2. Patient will demonstrate left/right SLS >/=30 seconds to demonstrate a reduced falls risk. Eval: Left SLS = 4 sec / Right SLS = 15 sec At PN: Goal met: B SLS = 30 sec 3. Patient will subjectively report ambulation tolerance >/=60 minutes to improve ease with household ADLs. Eval: Ambulation Tolerance = 30 min At PN: Not met: ambulation tolerance = 45 mins Key functional changes: None Problems/ barriers to goal attainment: None. Assessment / Recommendations: 
 
Pt has progressed toward LTGs. Pt has increased strength in her hip since [facility], but continues to remain limited. Pt has progressed to WNL for B SLS balance. Pt reports feeling limited with ambulation tolerance due to her back and leg with patient noted with lower back discomfort with therapist questioning whether secondary to surgical correction of chronic LLD.  
  
Patient will continue to benefit from skilled PT services to modify and progress therapeutic interventions, address functional mobility deficits, address ROM deficits, address strength deficits and analyze and address soft tissue restrictions to attain remaining goals. Problem List: pain affecting function, decrease ROM, decrease strength, impaired gait/ balance, decrease ADL/ functional abilitiies and decrease activity tolerance Treatment Plan: Therapeutic exercise, Therapeutic activities, Neuromuscular re-education, Physical agent/modality, Gait/balance training, Manual therapy, Patient education, Self Care training, Functional mobility training, Home safety training and Stair training Updated Goals: Continue with unmet goals above. Frequency / Duration: Patient to be seen 2 times per week for 3 weeks: 
 
Mobility: Current: CK / Goal: MATILDA Bailon Friday, PT 1/24/2019 12:54 PM

## 2019-01-24 NOTE — PROGRESS NOTES
PT DAILY TREATMENT NOTE 10-18 Patient Name: Soo Andres Date:2019 : 1950 [x]  Patient  Verified Payor: [place] MED[place]ARE / Plan: Alex Kumar / Product Type: Medicare / In time:10:00  Out time:10:52 Total Treatment Time (min): 52 Visit #: 8 of 16 Medicare/BCBS Only Total Timed Codes (min):  52 1:1 Treatment Time:  40 Treatment Area: Left hip pain [M25.552] SUBJECTIVE Pain Level (0-10 scale): 0 Any medication changes, allergies to medications, adverse drug reactions, diagnosis change, or new procedure performed?: [x] No    [] Yes (see summary sheet for update) Subjective functional status/changes:   [] No changes reported Pt reports she feels that she fatigues faster than she wants with her leg. OBJECTIVE Min Type Additional Details  
 [] Estim:  []Unatt       []IFC  []Premod []Other:  []w/ice   []w/heat Position: Location:  
 [] Estim: []Att    []TENS instruct  []NMES []Other:  []w/US   []w/ice   []w/heat Position: Location:  
 []  Traction: [] Cervical       []Lumbar 
                     [] Prone          []Supine []Intermittent   []Continuous Lbs: 
[] before manual 
[] after manual  
 []  Ultrasound: []Continuous   [] Pulsed []1MHz   []3MHz W/cm2: 
Location:  
 []  Iontophoresis with dexamethasone Location: [] Take home patch  
[] In clinic  
 []  Ice     []  heat 
[]  Ice massage 
[]  Laser  
[]  Anodyne Position: Location:  
 []  Laser with stim 
[]  Other:  Position: Location:  
 []  Vasopneumatic Device Pressure:       [] lo [] med [] hi  
Temperature: [] lo [] med [] hi  
[] Skin assessment post-treatment:  []intact []redness- no adverse reaction 
  []redness  adverse reaction:  
 
  
  
42 min Therapeutic Exercise:  [x] See flow sheet :  
Rationale: increase ROM and increase strength to improve the patients ability to increase tolerance to activities.  
  
10 min Neuromuscular Re-education:  [x]  See flow sheet :balance.   
Rationale: increase ROM, increase strength, improve coordination and improve balance  to improve the patients ability to increase ease with ADLs.   
  
  
 
 
 
       
With 
 [] TE 
 [] TA 
 [] neuro 
 [] other: Patient Education: [x] Review HEP [] Progressed/Changed HEP based on:  
[] positioning   [] body mechanics   [] transfers   [] heat/ice application   
[] other:   
 
Other Objective/Functional Measures: see goals Pain Level (0-10 scale) post treatment: 0 
 
ASSESSMENT/Changes in Function: Pt has progressed toward LTGs. Pt has increased strength in her hip since [facility], but continues to remain limited. Pt has progressed to WNL for B SLS balance. Pt reports feeling limited with ambulation tolerance due to her back and leg. Patient will continue to benefit from skilled PT services to modify and progress therapeutic interventions, address functional mobility deficits, address ROM deficits, address strength deficits and analyze and address soft tissue restrictions to attain remaining goals. []  See Plan of Care [x]  See progress note/recertification 
[]  See Discharge Summary Progress towards goals / Updated goals: 
Short Term Goals: To be accomplished in 3 weeks: 1. Patient will subjectively report full compliance with prescribed HEP. Eval: HEP provided Current: MET, reports performing HEP daily, 1/3/19 2. Patient will demonstrate left hip abduction MMT >/=4+/5 to improve ease with ambulation on uneven surfaces. Eval: Left Hip Abduction MMT = 3+/5 Current: Progressing, Left Hip Abduction MMT = 4/5, 1/22/2019 3. Patient will demonstrate left hip extension MMT >/=4+/5 to improve ease with curb management. Eval: Left Hip Extension MMT = 2+/5 Current: Progressing, Left Hip Extension MMT = 4/5, 1/22/2019 
  
Long Term Goals: To be accomplished in 6-8 weeks: 1. Patient will demonstrate a significant functional improvement as demonstrated by a score of >/= 70 on FOTO. Eval: FOTO = 58 
2. Patient will demonstrate left/right SLS >/=30 seconds to demonstrate a reduced falls risk. Eval: Left SLS = 4 sec / Right SLS = 15 sec Current: Goal met: B SLS = 30 sec. 1/24/19 3. Patient will subjectively report ambulation tolerance >/=60 minutes to improve ease with household ADLs. Eval: Ambulation Tolerance = 30 min Current: Not met: ambulation tolerance = 45 mins. 1/24/19 
  
 
 
 
PLAN 
[]  Upgrade activities as tolerated     [x]  Continue plan of care 
[]  Update interventions per flow sheet      
[]  Discharge due to:_ 
[]  Other:_ Eliana Trujillo PTA 1/24/2019  9:59 AM 
 
Future Appointments Date Time Provider Claire Johnson 1/24/2019 10:00 AM Kulwinder Guy PTA [facility]  
1/29/2019  8:30 AM Lino Zavala, PT [facility]  
1/31/2019 10:00 AM Lino Zavala PT [facility]  
2/5/2019  9:00 AM Lino Zavala PT [facility]  
2/7/2019  9:00 AM Kulwinder Guy PTA [facility]  
2/12/2019  9:00 AM Lino Zavala PT [facility]

## 2019-01-29 ENCOUNTER — HOSPITAL ENCOUNTER (OUTPATIENT)
Dept: PHYSICAL THERAPY | Age: 69
Discharge: HOME | End: 2019-01-29
Payer: MEDICARE

## 2019-01-29 PROCEDURE — 97110 THERAPEUTIC EXERCISES: CPT

## 2019-01-29 PROCEDURE — 97112 NEUROMUSCULAR REEDUCATION: CPT

## 2019-01-29 NOTE — PROGRESS NOTES
PT DAILY TREATMENT NOTE 10-18 Patient Name: Yaneth Schaffer Date:2019 : 1950 [x]  Patient  Verified Payor: [place] MEDICARE / Plan: Alex Kumar / Product Type: Medicare / In time:830  Out time:925 Total Treatment Time (min): 55 Visit #: 9 of 16 Medicare/BCBS Only Total Timed Codes (min):  55 1:1 Treatment Time:  40 Treatment Area: Left hip pain [M25.552] SUBJECTIVE Pain Level (0-10 scale): 0 Any medication changes, allergies to medications, adverse drug reactions, diagnosis change, or new procedure performed?: [x] No    [] Yes (see summary sheet for update) Subjective functional status/changes:   [] No changes reported Patient reports progressing her independent exercise regime without adverse response. OBJECTIVE 20 min Therapeutic Exercise:  _ See flow sheet : Emphasis placed on improving available hip AROM and LE strength Rationale: increase ROM and increase strength to improve the patients ability to improve ease with stair management. 
  
35 min Neuromuscular Re-education:  [x]  See flow sheet : Emphasis placed on improving activation and recruitment of the gluteal and quadriceps musculature and improving LE proprioceptive and kinesthetic awareness Rationale: increase ROM, increase strength, improve balance and increase proprioception  to improve the patients ability to improve ease with functional lifting. With 
 [] TE 
 [] TA 
 [] neuro 
 [] other: Patient Education: [x] Review HEP [] Progressed/Changed HEP based on:  
[] positioning   [] body mechanics   [] transfers   [] heat/ice application   
[] other:   
 
Pain Level (0-10 scale) post treatment: 0 
 
ASSESSMENT/Changes in Function: Further progressed dynamic LE strengthening to promote ease with return to previously performed exercise regime with progression towards discharge.  Patient nearing discharge at this time with discussion regarding ensuring tolerance to progressive independent exercise regime prior to discharge. Patient will continue to benefit from skilled PT services to modify and progress therapeutic interventions, address functional mobility deficits, address ROM deficits, address strength deficits, analyze and address soft tissue restrictions, analyze and cue movement patterns and analyze and modify body mechanics/ergonomics to attain remaining goals. []  See Plan of Care 
[]  See progress note/recertification 
[]  See Discharge Summary Progress towards goals / Updated goals: 
 
Short Term Goals: To be accomplished in 3 weeks: 1. Patient will subjectively report full compliance with prescribed HEP. At PN: Met, Reports performing HEP daily, 1/3/19 2. Patient will demonstrate left hip abduction MMT >/=4+/5 to improve ease with ambulation on uneven surfaces. At PN: Progressing, Left Hip Abduction MMT = 4/5, 1/22/2019 3. Patient will demonstrate left hip extension MMT >/=4+/5 to improve ease with curb management. At PN: Progressing, Left Hip Extension MMT = 4/5, 1/22/2019 
  
Long Term Goals: To be accomplished in 6-8 weeks: 1. Patient will demonstrate a significant functional improvement as demonstrated by a score of >/= 70 on FOTO. Eval: FOTO = 58 
2. Patient will demonstrate left/right SLS >/=30 seconds to demonstrate a reduced falls risk. At PN: Goal met: B SLS = 30 sec. 1/24/19 3. Patient will subjectively report ambulation tolerance >/=60 minutes to improve ease with household ADLs. At PN: Not met: ambulation tolerance = 45 mins. 1/24/19 Current: Remains, Ambulation Tolerance = 45 min (limitation secondary to lumbar discomfort), 1/29/2019 PLAN [x]  Upgrade activities as tolerated     [x]  Continue plan of care 
[]  Update interventions per flow sheet      
[]  Discharge due to:_ 
[]  Other:_   
 
Denise Montelongo, PT 1/29/2019  7:50 AM 
 
Future Appointments Date Time Provider Claire Johnson 1/29/2019  8:30 AM Suyapa Eugene, PT [facility]  
1/31/2019 10:00 AM Suyapa Eugene, PT [facility]  
2/5/2019  9:00 AM Suyapa Eugene, PT [facility]  
2/7/2019  9:00 AM Maribeth Ye, PTA [facility]  
2/12/2019  9:00 AM Suyapa Eugene, PT [facility]

## 2019-01-31 ENCOUNTER — HOSPITAL ENCOUNTER (OUTPATIENT)
Dept: PHYSICAL THERAPY | Age: 69
Discharge: HOME | End: 2019-01-31
Payer: MEDICARE

## 2019-01-31 PROCEDURE — 97112 NEUROMUSCULAR REEDUCATION: CPT

## 2019-01-31 PROCEDURE — 97110 THERAPEUTIC EXERCISES: CPT

## 2019-01-31 NOTE — PROGRESS NOTES
PT DAILY TREATMENT NOTE 10-18 Patient Name: Parminder Nava Date:2019 : 1950 [x]  Patient  Verified Payor: [place] MEDICARE / Plan: Alex Kumar / Product Type: Medicare / In time:1000  Out time:1057 Total Treatment Time (min): 57 Visit #: 10 of 16 Medicare/BCBS Only Total Timed Codes (min):  57 1:1 Treatment Time:  45 Treatment Area: Left hip pain [M25.552] SUBJECTIVE Pain Level (0-10 scale): 0 Any medication changes, allergies to medications, adverse drug reactions, diagnosis change, or new procedure performed?: [x] No    [] Yes (see summary sheet for update) Subjective functional status/changes:   [] No changes reported Patient reports completion of modified workout regime yesterday without adverse response. OBJECTIVE 20 min Therapeutic Exercise:  _ See flow sheet : Emphasis placed on improving available hip AROM and LE strength Rationale: increase ROM and increase strength to improve the patients ability to improve ease with stair management. 
  
37 min Neuromuscular Re-education:  [x]  See flow sheet : Emphasis placed on improving activation and recruitment of the gluteal and quadriceps musculature and improving LE proprioceptive and kinesthetic awareness Rationale: increase ROM, increase strength, improve balance and increase proprioception  to improve the patients ability to improve ease with functional lifting. With 
 [] TE 
 [] TA 
 [] neuro 
 [] other: Patient Education: [x] Review HEP [] Progressed/Changed HEP based on:  
[] positioning   [] body mechanics   [] transfers   [] heat/ice application   
[] other:   
 
Pain Level (0-10 scale) post treatment: 1 ASSESSMENT/Changes in Function: Continued to progress weightbearing static and dynamic strengthening on unstable and stable services to improve ease with completion and progression of independent exercise regime.  Patient with good tolerance but with benefit with receival of verbal cuing to correct LE rotation. Patient will continue to benefit from skilled PT services to modify and progress therapeutic interventions, address functional mobility deficits, address ROM deficits, address strength deficits, analyze and address soft tissue restrictions and analyze and cue movement patterns to attain remaining goals. []  See Plan of Care 
[]  See progress note/recertification 
[]  See Discharge Summary Progress towards goals / Updated goals: 
 
Short Term Goals: To be accomplished in 3 weeks: 1. Patient will subjectively report full compliance with prescribed HEP. At PN: Met, Reports performing HEP daily, 1/3/19 2. Patient will demonstrate left hip abduction MMT >/=4+/5 to improve ease with ambulation on uneven surfaces. At PN: Progressing, Left Hip Abduction MMT = 4/5, 1/22/2019 3. Patient will demonstrate left hip extension MMT >/=4+/5 to improve ease with curb management. At PN: Progressing, Left Hip Extension MMT = 4/5, 1/22/2019 
  
Long Term Goals: To be accomplished in 6-8 weeks: 1. Patient will demonstrate a significant functional improvement as demonstrated by a score of >/= 70 on FOTO. Eval: FOTO = 58 
2. Patient will demonstrate left/right SLS >/=30 seconds to demonstrate a reduced falls risk. At PN: Goal met: B SLS = 30 sec. 1/24/19 3. Patient will subjectively report ambulation tolerance >/=60 minutes to improve ease with household ADLs. At PN: Not met: ambulation tolerance = 45 mins. 1/24/19 Current: Remains, Ambulation Tolerance = 45 min (limitation secondary to lumbar discomfort), 1/29/2019 PLAN [x]  Upgrade activities as tolerated     [x]  Continue plan of care 
[]  Update interventions per flow sheet      
[]  Discharge due to:_ 
[]  Other:_   
 
Tiffany Auguste, PT 1/31/2019  7:51 AM 
 
Future Appointments Date Time Provider Claire Johnson 1/31/2019 10:00 AM Kaylan Fowler, PT [facility]  
2/5/2019  9:00 AM Kaylan Fowler, PT [facility]  
2/7/2019  9:00 AM Emilie Rush, SHIRIN [facility]  
2/12/2019  9:00 AM Kaylan Fowler, PT [facility]

## 2019-02-05 ENCOUNTER — HOSPITAL ENCOUNTER (OUTPATIENT)
Dept: PHYSICAL THERAPY | Age: 69
Discharge: HOME | End: 2019-02-05
Payer: MEDICARE

## 2019-02-05 PROCEDURE — 97110 THERAPEUTIC EXERCISES: CPT

## 2019-02-05 PROCEDURE — 97112 NEUROMUSCULAR REEDUCATION: CPT

## 2019-02-05 NOTE — PROGRESS NOTES
PT DAILY TREATMENT NOTE 10-18 Patient Name: Parminder Nava Date:2019 : 1950 [x]  Patient  Verified Payor: [place] MEDICARE / Plan: Alex Dawsonsanta / Product Type: Medicare / In time:900  Out time:954 Total Treatment Time (min): 54 Visit #: 56 UC 08 Medicare/BCBS Only Total Timed Codes (min):  54 1:1 Treatment Time:  40 Treatment Area: Left hip pain [M25.552] SUBJECTIVE Pain Level (0-10 scale): 0 Any medication changes, allergies to medications, adverse drug reactions, diagnosis change, or new procedure performed?: [x] No    [] Yes (see summary sheet for update) Subjective functional status/changes:   [] No changes reported Patient reports continued independent progression of modified gym regime without adverse response. OBJECTIVE 20 min Therapeutic Exercise:  _ See flow sheet : Emphasis placed on improving available hip AROM and LE strength Rationale: increase ROM and increase strength to improve the patients ability to improve ease with stair management. 
  
34 min Neuromuscular Re-education:  [x]  See flow sheet : Emphasis placed on improving activation and recruitment of the gluteal and quadriceps musculature and improving LE proprioceptive and kinesthetic awareness Rationale: increase ROM, increase strength, improve balance and increase proprioception  to improve the patients ability to improve ease with functional lifting. With 
 [] TE 
 [] TA 
 [] neuro 
 [] other: Patient Education: [x] Review HEP [] Progressed/Changed HEP based on:  
[] positioning   [] body mechanics   [] transfers   [] heat/ice application   
[] other:   
 
Pain Level (0-10 scale) post treatment: 0 
 
ASSESSMENT/Changes in Function: Patient in agreement to be placed on hold/discharge after completion of next treatment with patient having demonstrated a significant functional improvement since [facility] with initiation and progression of independent gym regime without adverse response. Patient will continue to benefit from skilled PT services to modify and progress therapeutic interventions, address functional mobility deficits, address ROM deficits, address strength deficits, analyze and address soft tissue restrictions, analyze and cue movement patterns, analyze and modify body mechanics/ergonomics and assess and modify postural abnormalities to attain remaining goals. []  See Plan of Care 
[]  See progress note/recertification 
[]  See Discharge Summary Progress towards goals / Updated goals: 
 
Short Term Goals: To be accomplished in 3 weeks: 1. Patient will subjectively report full compliance with prescribed HEP. At PN: Met, Reports performing HEP daily, 1/3/19 2. Patient will demonstrate left hip abduction MMT >/=4+/5 to improve ease with ambulation on uneven surfaces. At PN: Progressing, Left Hip Abduction MMT = 4/5, 1/22/2019 3. Patient will demonstrate left hip extension MMT >/=4+/5 to improve ease with curb management. At PN: Progressing, Left Hip Extension MMT = 4/5, 1/22/2019 
  
Long Term Goals: To be accomplished in 6-8 weeks: 1. Patient will demonstrate a significant functional improvement as demonstrated by a score of >/= 70 on FOTO. Eval: FOTO = 58 Current: Met, FOTO = 77, 2/5/2019 2. Patient will demonstrate left/right SLS >/=30 seconds to demonstrate a reduced falls risk. At PN: Goal met: B SLS = 30 sec. 1/24/19 3. Patient will subjectively report ambulation tolerance >/=60 minutes to improve ease with household ADLs. At PN: Not met: ambulation tolerance = 45 mins. 1/24/19 Current: Remains, Ambulation Tolerance = 45 min (limitation secondary to lumbar discomfort), 1/29/2019 PLAN [x]  Upgrade activities as tolerated     [x]  Continue plan of care 
[]  Update interventions per flow sheet      
[]  Discharge due to:_ 
[]  Other:_   
 
Vasquez Hernandez PT 2/5/2019  7:52 AM 
 
Future Appointments Date Time Provider Claire Johnson 2/5/2019  9:00 AM Becki Lombardi0 N Krishna Murdock [facility]  
2/7/2019  9:00 AM Lokesh Vanegas PTA [facility]

## 2019-02-07 ENCOUNTER — HOSPITAL ENCOUNTER (OUTPATIENT)
Dept: PHYSICAL THERAPY | Age: 69
Discharge: HOME | End: 2019-02-07
Payer: MEDICARE

## 2019-02-07 PROCEDURE — G8978 MOBILITY CURRENT STATUS: HCPCS

## 2019-02-07 PROCEDURE — G8980 MOBILITY D/C STATUS: HCPCS

## 2019-02-07 PROCEDURE — G8979 MOBILITY GOAL STATUS: HCPCS

## 2019-02-07 PROCEDURE — 97110 THERAPEUTIC EXERCISES: CPT

## 2019-02-07 PROCEDURE — 97112 NEUROMUSCULAR REEDUCATION: CPT

## 2019-02-07 NOTE — PROGRESS NOTES
PT DAILY TREATMENT NOTE 10-18 Patient Name: Dawood Zepeda Date:2019 : 1950 [x]  Patient  Verified Payor: [place] MEDICARE / Plan: Alex Kumar / Product Type: Medicare / In time:9:03  Out time:9:56 Total Treatment Time (min): 53 Visit #: 12 of 16 Medicare/BCBS Only Total Timed Codes (min):  53 1:1 Treatment Time:  40 Treatment Area: Left hip pain [M25.552] SUBJECTIVE Pain Level (0-10 scale): 0 Any medication changes, allergies to medications, adverse drug reactions, diagnosis change, or new procedure performed?: [x] No    [] Yes (see summary sheet for update) Subjective functional status/changes:   [] No changes reported Pt reports she has been doing well and feels she can walk for an hour before her hip bothers her. OBJECTIVE Min Type Additional Details  
 [] Estim:  []Unatt       []IFC  []Premod []Other:  []w/ice   []w/heat Position: Location:  
 [] Estim: []Att    []TENS instruct  []NMES []Other:  []w/US   []w/ice   []w/heat Position: Location:  
 []  Traction: [] Cervical       []Lumbar 
                     [] Prone          []Supine []Intermittent   []Continuous Lbs: 
[] before manual 
[] after manual  
 []  Ultrasound: []Continuous   [] Pulsed []1MHz   []3MHz W/cm2: 
Location:  
 []  Iontophoresis with dexamethasone Location: [] Take home patch  
[] In clinic  
 []  Ice     []  heat 
[]  Ice massage 
[]  Laser  
[]  Anodyne Position: Location:  
 []  Laser with stim 
[]  Other:  Position: Location:  
 []  Vasopneumatic Device Pressure:       [] lo [] med [] hi  
Temperature: [] lo [] med [] hi  
[] Skin assessment post-treatment:  []intact []redness- no adverse reaction 
  []redness  adverse reaction:  
 
  
  
43 min Therapeutic Exercise:  [x] See flow sheet :  
Rationale: increase ROM and increase strength to improve the patients ability to increase tolerance to activities.  
  
10 min Neuromuscular Re-education:  [x]  See flow sheet :balance.   
Rationale: increase ROM, increase strength, improve coordination and improve balance  to improve the patients ability to increase ease with ADLs.   
  
  
  
 
 
 
       
With 
 [] TE 
 [] TA 
 [] neuro 
 [] other: Patient Education: [x] Review HEP [] Progressed/Changed HEP based on:  
[] positioning   [] body mechanics   [] transfers   [] heat/ice application   
[] other:   
 
Other Objective/Functional Measures: see goals Pain Level (0-10 scale) post treatment: 0 
 
ASSESSMENT/Changes in Function: Pt has met LTGs. Pt has increased hip strength to WNL. Pt reports she is able to ambulate for an hour before her hip starts to bother her. Pt has been provided with updated HEP and plans to further strengthen independently. Patient will continue to benefit from skilled PT services to modify and progress therapeutic interventions, address functional mobility deficits, address ROM deficits, address strength deficits and analyze and address soft tissue restrictions to attain remaining goals. []  See Plan of Care 
[]  See progress note/recertification 
[]  See Discharge Summary Progress towards goals / Updated goals: 
Short Term Goals: To be accomplished in 3 weeks: 1. Patient will subjectively report full compliance with prescribed HEP. At PN: Met, Reports performing HEP daily, 1/3/19 2. Patient will demonstrate left hip abduction MMT >/=4+/5 to improve ease with ambulation on uneven surfaces. At PN: Progressing, Left Hip Abduction MMT = 4/5, 1/22/2019 Current: Goal met: Left hip abd MMT 4+/5. 2/7/19 3. Patient will demonstrate left hip extension MMT >/=4+/5 to improve ease with curb management. At PN: Progressing, Left Hip Extension MMT = 4/5, 1/22/2019 Current: Goal met: Left hip ext MMT 5/5. 2/7/19 
  
Long Term Goals: To be accomplished in 6-8 weeks: 1. Patient will demonstrate a significant functional improvement as demonstrated by a score of >/= 70 on FOTO. Eval: FOTO = 58 Current: Met, FOTO = 77, 2/5/2019 2. Patient will demonstrate left/right SLS >/=30 seconds to demonstrate a reduced falls risk. At PN: Goal met: B SLS = 30 sec. 1/24/19 3. Patient will subjectively report ambulation tolerance >/=60 minutes to improve ease with household ADLs. At PN: Not met: ambulation tolerance = 45 mins. 1/24/19 Current:Goal met:  Ambulation Tolerance = 60. 2/7/19 PLAN 
[]  Upgrade activities as tolerated     []  Continue plan of care 
[]  Update interventions per flow sheet      
[]  Discharge due to:_ 
[x]  Other:_ Pt plans to go on hold. Rhoda Camacho PTA 2/7/2019  9:01 AM 
 
No future appointments.

## 2019-02-07 NOTE — PROGRESS NOTES
[facility] [address] [facility] [place], [address] [place]  
[phone] fax Progress Note Patient name: Bradford Atkinson Start of Care: 2019 Referral source: Cameron Calderon, * : 1950 Medical/Treatment Diagnosis: Left hip pain [M25.552] Payor: Erica Redding / Plan: [place] MEDICARE PART A & B / Product Type: Medicare /  Onset Date:DoS 2019 Prior Hospitalization: see medical history Provider#: [id number] Medications: Verified on Patient Summary List   
Comorbidities: Right TKA (), Left TKA (), Left Hip SCFE (Surgery x3 - Childhood), Left LE LLD (left LE shorter- Corrected with left FRANCK), Arthritis, Back Pain, BMI>30, HTN 
 Prior Level of Function: Ambulation without AD, (I) Functional ADLs, Retired, [address] (2-3x/week - UE/LE Resistance Regime), Cardio/Mauro Chi (2-3x/week), (I) Functional ADLs, (I) Driving Visits from Start of Care: 12    Missed Visits: 0 Established Goals:  
 
Short Term Goals: To be accomplished in 3 weeks: 1. Patient will subjectively report full compliance with prescribed HEP. At Last PN: Met, Reports performing HEP daily 2. Patient will demonstrate left hip abduction MMT >/=4+/5 to improve ease with ambulation on uneven surfaces. At Last PN: Progressing, Left Hip Abduction MMT = 4/5 At PN: Goal met: Left hip abd MMT 4+/5 3. Patient will demonstrate left hip extension MMT >/=4+/5 to improve ease with curb management. At Last PN: Progressing, Left Hip Extension MMT = 4/5 At PN: Goal met: Left hip ext MMT 5/5 
  
Long Term Goals: To be accomplished in 6-8 weeks: 1. Patient will demonstrate a significant functional improvement as demonstrated by a score of >/= 70 on FOTO. Eval: FOTO = 58 At PN: Met, FOTO = 77 
2. Patient will demonstrate left/right SLS >/=30 seconds to demonstrate a reduced falls risk. At Last PN: Goal met: B SLS = 30 sec 3. Patient will subjectively report ambulation tolerance >/=60 minutes to improve ease with household ADLs. At Last PN: Not met: ambulation tolerance = 45 mins At [facility] met:  Ambulation Tolerance = 60 Key Functional Changes: See above goals. Updated Goals: If further physical therapy services deemed appropriate further functional goals will be created. G-Codes (GP) Mobility  Current  CJ= 20-39%   Goal  CJ= 20-39% The severity rating is based on clinical judgment and the FOTO score. ASSESSMENT/RECOMMENDATIONS: 
 
Pt has met LTGs. Pt has increased hip strength to WNL. Pt reports she is able to ambulate for an hour before her hip starts to bother her. Pt has been provided with updated HEP and plans to further strengthen independently. Patient at this time appropriate to be placed on 30 day hold with patient independent progression of workout regime to ensure continued functional improvements. [x]Patient to be placed on hold x30 days - If no patient contact with clinic made within 30 day period patient to be discharged. []Continue therapy with the following recommended changes:_____________________      _____________________________________________________________________ []Discontinue therapy progressing towards or have reached established goals []Discontinue therapy due to lack of appreciable progress towards goals []Discontinue therapy due to lack of attendance or compliance []Await Physician's recommendations/decisions regarding therapy []Other:________________________________________________________________ Thank you for this referral.   
Mariella aMckenzie, PT 2/7/2019 12:49 PM 
NOTE TO PHYSICIAN:  PLEASE COMPLETE THE ORDERS BELOW AND  
FAX TO [facility]: [phone] If you are unable to process this request in 24 hours please contact our office: [phone] []  I have read the above report and request that my patient continue as recommended. []  I have read the above report and request that my patient continue therapy with the following changes/special instructions:________________________________________ []I have read the above report and request that my patient be discharged from therapy.  
 
[de-identified] Signature:____________Date:_________TIME:________ 
 
[facility], Date and Time must be completed for valid certification **

## 2019-02-12 ENCOUNTER — APPOINTMENT (OUTPATIENT)
Dept: PHYSICAL THERAPY | Age: 69
End: 2019-02-12
Payer: MEDICARE

## 2019-02-14 ENCOUNTER — APPOINTMENT (OUTPATIENT)
Dept: PHYSICAL THERAPY | Age: 69
End: 2019-02-14
Payer: MEDICARE

## 2019-03-21 NOTE — PROGRESS NOTES
[facility] [address] [facility] [place], [address] [place]  
[phone] fax Discharge Summary Patient name: Dayan Mariano Start of Care: 2018 Referral source: Elma Stevens, * : 1950 Medical/Treatment Diagnosis: Left hip pain [M25.552] Payor: Simeon Man / Plan: [place] MEDICARE PART A & B / Product Type: Medicare /  Onset Date:DoS 2018 Prior Hospitalization: see medical history Provider#: [id number] Medications: Verified on Patient Summary List   
Comorbidities: Right TKA (), Left TKA (), Left Hip SCFE (Surgery x3 - Childhood), Left LE LLD (left LE shorter- Corrected with left FRANCK), Arthritis, Back Pain, BMI>30, HTN 
 Prior Level of Function: Ambulation without AD, (I) Functional ADLs, Retired, [address] (2-3x/week - UE/LE Resistance Regime), Cardio/Mauro Chi (2-3x/week), (I) Functional ADLs, (I) Driving Visits from Start of Care: 12                                                Missed Visits: 0 Reporting Period : 2018 to 2019 Summary of Care: 
 
Short Term Goals: To be accomplished in 3 weeks: 1. Patient will subjectively report full compliance with prescribed HEP. At Last PN: Met, Reports performing HEP daily 2. Patient will demonstrate left hip abduction MMT >/=4+/5 to improve ease with ambulation on uneven surfaces. At Last PN: Progressing, Left Hip Abduction MMT = 4/5 At DC: Goal met: Left hip abd MMT 4+/5 3. Patient will demonstrate left hip extension MMT >/=4+/5 to improve ease with curb management. At Last PN: Progressing, Left Hip Extension MMT = 4/5 At DC: Goal met: Left hip ext MMT 5/5 
  
Long Term Goals: To be accomplished in 6-8 weeks: 1. Patient will demonstrate a significant functional improvement as demonstrated by a score of >/= 70 on FOTO. Eval: FOTO = 58 At DC: Met, FOTO = 77 
2.  Patient will demonstrate left/right SLS >/=30 seconds to demonstrate a reduced falls risk. At Last PN: Goal met: B SLS = 30 sec 3. Patient will subjectively report ambulation tolerance >/=60 minutes to improve ease with household ADLs. At Last PN: Not met: ambulation tolerance = 45 mins At DC:Goal met:  Ambulation Tolerance = 60 
 
G-Codes (GP) Mobility  Goal  CJ= 20-39%  D/C  CJ= 20-39% The severity rating is based on clinical judgment and the FOTO score. ASSESSMENT/RECOMMENDATIONS: 
 
At this time patient to be discharged with patient having last been seen within clinic 2/7/2019 at which time patient was placed on 30 day hold with provision of updated HEP to allow for continued independent progression. Patient without contact with clinic within 30 day period with patient to be discharged in accordance with clinic 30 day policy. [x]Discontinue therapy: [x]Patient has reached or is progressing toward set goals []Patient is non-compliant or has abdicated 
    []Due to lack of appreciable progress towards set goals Axel Bell, PT 3/21/2019 8:21 AM

## 2020-12-03 ENCOUNTER — HOSPITAL ENCOUNTER (OUTPATIENT)
Dept: PHYSICAL THERAPY | Age: 70
Discharge: HOME | End: 2020-12-03
Payer: MEDICARE

## 2020-12-03 PROCEDURE — 97162 PT EVAL MOD COMPLEX 30 MIN: CPT

## 2020-12-03 PROCEDURE — 97110 THERAPEUTIC EXERCISES: CPT

## 2020-12-03 NOTE — PROGRESS NOTES
[facility]              [address] [facility]        [place], [address] [place]   [phone] fax    Plan of Care/ Statement of Necessity for Physical Therapy Services    Patient name: Davidson Dave Start of Care: 12/3/2020   Referral source: Dina Charlton* : 1950    Medical Diagnosis: Pain in right shoulder [M25.511]  Payor: Blade Romero / Plan: [place] MEDICARE PART A & B / Product Type: Medicare /  Onset Date:  DoS 2020    Treatment Diagnosis: Right Shoulder Pain s/[ TSA   Prior Hospitalization: see medical history Provider#: [id number]   Medications: Verified on Patient summary List    Comorbidities: Right TKA (), Left TKA (), Left Hip SCFE (Surgery x3 - Childhood), Left LE LLD (left LE shorter- Corrected with left FRANCK), Arthritis, Back Pain, BMI>30, HTN, Left FRANCK (DoS 2018), Left TSA (DoS 2018)   Prior Level of Function: (I) Functional ADLs, Retired, Workout (2-3x/week - UE/LE Resistance Regime), Cardio/Mauro Chi, (I) Driving, Gardening, Biking      The Plan of Care and following information is based on the information from the initial evaluation. Assessment:    Patient presents s/p right shoulder replacement ( DoS 2020). Patient presents with a normal neurovascular screen. With full subjective compliance with sling wear with verbal review of post-surgical precautions and post-surgical protocol. Patient demonstrates right shoulder PROM consistent with current phase of post-surgical protocol.  To progress patient in accordance with post-surgical protocol, MD discretion and patient tolerance to optimize patient return to PLOF with patient an active older adult with desire to return back to previous level of function to include regular workout regime, gardening, housework and .     Patient will continue to benefit from skilled PT services to modify and progress therapeutic interventions, address functional mobility deficits, address ROM deficits, address strength deficits, analyze and address soft tissue restrictions, analyze and cue movement patterns, analyze and modify body mechanics/ergonomics and assess and modify postural abnormalities to attain remaining goals. Key Information:    Posture: With proper positioning of UltraSling with correct placement of abductor pillow     Neurovascular Screen: 2+ Radial Pulse, Intact Capillary Refill, Intact gross sensation to light touch   Surgical Incision: No active/dried drainage visisble with steristrips intact     ROM:  []? Unable to assess at this time                                               AROM                                                   PROM    Left Right   Right   Flexion 130 NT Flexion 130   Extension 68 NT Extension     Scaption 82 NT Scaption 75   ER @ 0 Degrees   NT ER @ 0 Degrees 15   Functional ER C3 NT ER @ 90 Degrees NT   Functional IR T10 NT IR @ 90 Degrees NT   Elbow AROM: 0-150 deg     Evaluation Complexity History MEDIUM  Complexity : 1-2 comorbidities / personal factors will impact the outcome/ POC ; Examination MEDIUM Complexity : 3 Standardized tests and measures addressing body structure, function, activity limitation and / or participation in recreation  ;Presentation MEDIUM Complexity : Evolving with changing characteristics  ; Clinical Decision Making MEDIUM Complexity : FOTO score of 26-74  Overall Complexity Rating: MEDIUM  Problem List: pain affecting function, decrease ROM, decrease strength, decrease ADL/ functional abilitiies, decrease activity tolerance and decrease flexibility/ joint mobility   Treatment Plan may include any combination of the following: Therapeutic exercise, Therapeutic activities, Neuromuscular re-education, Physical agent/modality, Manual therapy, Patient education, Self Care training, Functional mobility training and Home safety training  Patient / Family readiness to learn indicated by: asking questions, trying to perform skills and interest  Persons(s) to be included in education: patient (P)  Barriers to Learning/Limitations: None  Patient Goal (s): Return to full use of the right shoulder and ability to swim next summer.   Patient Self Reported Health Status: excellent  Rehabilitation Potential: good    Short Term Goals: To be accomplished in 2 weeks:  1. Patient will subjectively report full compliance with prescribed HEP. Eval: HEP provided  2. Patient will demonstrate right shoulder ER (0 deg abd) PROM = 40 degrees to improve ease with upper body dressing. Eval: Right Shoulder ER (0 deg abduction) = 15 deg  3. Patient will demonstrate right shoulder flexion and scaption AAROM >/= 90 degrees to improve ease with return to PLOF. Eval; NT    Long Term Goals: To be accomplished in 4 weeks:  1. Patient will demonstrate a significant functional improvement as demonstrated by a score of >/= 57 on FOTO. Eval: FOTO = 47       2. Patient will demonstrate right shoulder flexion and scaption MMT >/= 4/5 to improve ease with household ADLs. Eval: NT per protocol  3. Patient will demonstrate right shoulder functional ER >/= Occiput to improve ease with self-care ADLs. Eval: NT per protocol    Frequency / Duration: Patient to be seen 2 times per week for 4 weeks. Patient/ Caregiver education and instruction: Diagnosis, prognosis, self care, activity modification and exercises   [x]  Plan of care has been reviewed with PTA      Certification Period: 12/3/2020 - 1/1/2020  Peggy Fair, PT 12/3/2020 11:46 AM  ________________________________________________________________________    I certify that the above Therapy Services are being furnished while the patient is under my care. I agree with the treatment plan and certify that this therapy is necessary.     Physician's Signature:____________Date:_________TIME:________    [employer], Date and Time must be completed for valid certification **  Please sign and return to [facility] [address], [address]  [phone] fax

## 2020-12-03 NOTE — PROGRESS NOTES
PT DAILY TREATMENT NOTE     Patient Name: Rene Kline  Date:12/3/2020  : 1950  [x]  Patient  Verified  Payor: Wilman Thorne / Plan: [place] MEDICARE PART A & B / Product Type: Medicare /    In time:1104  Out time:1135  Total Treatment Time (min): 31  Visit #: 1 of 8    Medicare/BCBS Only   Total Timed Codes (min):  15 1:1 Treatment Time:  31       Treatment Area: Pain in right shoulder [M25.511]    Physical Therapy Evaluation - Shoulder    SUBJECTIVE      Any medication changes, allergies to medications, adverse drug reactions, diagnosis change, or new procedure performed?: [x] No    [] Yes (see summary sheet for update)    Subjective functional status/changes:     PLOF: (I) Functional ADLs, Retired, [address] (2-3x/week - UE/LE Resistance Regime), Cardio/Mauro Chi, (I) Driving, Gardening, Biking  Current Functional Status: Use of sling as instructed, Non-use of right UE with performance of functional household and self-care ADLs  Work Hx: Retired  Living Situation: Lives in [facility] (bedroom on first floor) - Lives with   Comorbidities: Right TKA (), Left TKA (), Left Hip SCFE (Surgery x3 - Childhood), Left LE LLD (left LE shorter- Corrected with left FRANCK), Arthritis, Back Pain, BMI>30, HTN, Left FRANCK (DoS 2018), Left TSA (DoS 2018)  Medications: Advil PM (night-time), Advil (PRN - Bedtime)    Subjective: Patient presents s/p right shoulder replacement ( DoS 2020). Patient with use of UltraSling with abductor pillow, with non-use when sitting/sedentary with use of sling while napping/sleeping. Patient denies: s/s of infection, hand swelling, UE N/T, chest pain/tightness/shortness of breath. Patient with prescription of pendulums and elbow flexion/extension PROM. Patient with left TSA in 2018 with patient without return of full ROM with patient correlating to nerve damage which is believed to have occurred during surgery.       Pain Intensity (0-10, VAS): Current 0, Worst 2, Best 0    Patient Goals: \"Return to full use of the right shoulder and ability to swim next summer. \"     OBJECTIVE EXAMINATION    BP: 156/88 mmHg  Posture: With proper positioning of UltraSling with correct placement of abductor pillow    Neurovascular Screen: 2+ Radial Pulse, Intact Capillary Refill, Intact gross sensation to light touch   Surgical Incision: No active/dried drainage visisble with steristrips intact    ROM:  [] Unable to assess at this time                                               AROM                                                   PROM   Left Right  Right   Flexion 130 NT Flexion 130   Extension 68 NT Extension    Scaption 82 NT Scaption 75   ER @ 0 Degrees  NT ER @ 0 Degrees 15   Functional ER C3 NT ER @ 90 Degrees NT   Functional IR T10 NT IR @ 90 Degrees NT   Elbow AROM: 0-150 deg    Strength:   [] Unable to assess at this time                                                                            L (1-5) R (1-5)   Flexors 4 NT   Abductors 2+ NT   External Rotators 5 NT   Internal Rotators 5 NT   Extensors 5 NT   Elbow Flexion 5 NT   Elbow Extension 5 NT     Joint Mobility    Palpation    Other Tests / Comments:     OBJECTIVE    16 min [x]Eval                  []Re-Eval     15 min Therapeutic Exercise:  [x] See flow sheet : Patient educated regarding completion of prescribed HEP and provided with written HEP instructions, Patient educated regarding diagnosis and PT POC   Rationale: increase ROM and increase strength to improve the patients ability to improve ease with functional ADLs      With   [] TE   [] TA   [] neuro   [] other: Patient Education: [x] Review HEP    [] Progressed/Changed HEP based on:   [] positioning   [] body mechanics   [] transfers   [] heat/ice application    [] other:      Other Objective/Functional Measures: See objective above.      Pain Level (0-10 scale) post treatment: 0    ASSESSMENT/Changes in Function: Patient presents s/p right shoulder replacement ( DoS 11/12/2020). Patient presents with a normal neurovascular screen. With full subjective compliance with sling wear with verbal review of post-surgical precautions and post-surgical protocol. Patient demonstrates right shoulder PROM consistent with current phase of post-surgical protocol. To progress patient in accordance with post-surgical protocol, MD discretion and patient tolerance to optimize patient return to PLOF with patient an active older adult with desire to return back to previous level of function to include regular workout regime, gardening, housework and . Patient will continue to benefit from skilled PT services to modify and progress therapeutic interventions, address functional mobility deficits, address ROM deficits, address strength deficits, analyze and address soft tissue restrictions, analyze and cue movement patterns, analyze and modify body mechanics/ergonomics and assess and modify postural abnormalities to attain remaining goals. [x]  See Plan of Care  []  See progress note/recertification  []  See Discharge Summary         Progress towards goals / Updated goals:    Short Term Goals: To be accomplished in 2 weeks:  1. Patient will subjectively report full compliance with prescribed HEP. Eval: HEP provided  2. Patient will demonstrate right shoulder ER (0 deg abd) PROM = 40 degrees to improve ease with upper body dressing. Eval: Right Shoulder ER (0 deg abduction) = 15 deg  3. Patient will demonstrate right shoulder flexion and scaption AAROM >/= 90 degrees to improve ease with return to PLOF. Eval; NT    Long Term Goals: To be accomplished in 4 weeks:  1. Patient will demonstrate a significant functional improvement as demonstrated by a score of >/= 57 on FOTO. Eval: FOTO = 47       2. Patient will demonstrate right shoulder flexion and scaption MMT >/= 4/5 to improve ease with household ADLs. Eval: NT per protocol  3.  Patient will demonstrate right shoulder functional ER >/= Occiput to improve ease with self-care ADLs.   Eval: NT per protocol      PLAN  [x]  Upgrade activities as tolerated     []  Continue plan of care  []  Update interventions per flow sheet       []  Discharge due to:_  []  Other:_      Traci Garcia, PT 12/3/2020  10:50 AM    Future Appointments   Date Time Provider Claire Johnson   12/3/2020 11:00 AM Vik Bernstein, PT [facility]

## 2020-12-08 ENCOUNTER — HOSPITAL ENCOUNTER (OUTPATIENT)
Dept: PHYSICAL THERAPY | Age: 70
Discharge: HOME | End: 2020-12-08
Payer: MEDICARE

## 2020-12-08 PROCEDURE — 97140 MANUAL THERAPY 1/> REGIONS: CPT | Performed by: PHYSICAL THERAPIST

## 2020-12-08 PROCEDURE — 97110 THERAPEUTIC EXERCISES: CPT | Performed by: PHYSICAL THERAPIST

## 2020-12-08 NOTE — PROGRESS NOTES
PT DAILY TREATMENT NOTE     Patient Name: Seth Greenwood  Date:2020  : 1950  [x]  Patient  Verified  Payor: VA MEDICARE / Plan: VA MEDICARE PART A & B / Product Type: Medicare /    In time:9:30  Out time:10:12  Total Treatment Time (min): 42  Visit #: 2 of 8    Medicare/BCBS Only   Total Timed Codes (min):  42 1:1 Treatment Time:  42       Treatment Area: Pain in right shoulder [M25.511]    SUBJECTIVE  Pain Level (0-10 scale): 0  Any medication changes, allergies to medications, adverse drug reactions, diagnosis change, or new procedure performed?: [x] No    [] Yes (see summary sheet for update)  Subjective functional status/changes:   [] No changes reported  Patient reports she has been performing elbow curls at home with 3 pound weights. She denies pain in the right shoulder. OBJECTIVE    32 min Therapeutic Exercise:  [] See flow sheet : Emphasis on increasing her AROM and strength. Rationale: increase ROM and increase strength to improve the patients ability to increase ease with household ADLs. 10 min Manual Therapy:  Manual stretching to increase elevation and ER,    The manual therapy interventions were performed at a separate and distinct time from the therapeutic activities interventions. Rationale: increase ROM and increase tissue extensibility to increase ease of motion to improve function. With   [] TE   [] TA   [] neuro   [] other: Patient Education: [x] Review HEP    [] Progressed/Changed HEP based on:   [] positioning   [] body mechanics   [] transfers   [] heat/ice application    [] other:      Other Objective/Functional Measures: Passive flexion to 100 degrees. Reviewed protocol with patient. Pain Level (0-10 scale) post treatment: 0    ASSESSMENT/Changes in Function: Patient with decreased pain and improving use of the right arm.      Patient will continue to benefit from skilled PT services to modify and progress therapeutic interventions, address functional mobility deficits, address ROM deficits, address strength deficits, analyze and address soft tissue restrictions, analyze and cue movement patterns, analyze and modify body mechanics/ergonomics and assess and modify postural abnormalities to attain remaining goals. [x]  See Plan of Care  []  See progress note/recertification  []  See Discharge Summary         Progress towards goals / Updated goals:  Short Term Goals: To be accomplished in 2 weeks:  1. Patient will subjectively report full compliance with prescribed HEP. Eval: HEP provided  Current:  Patient reports compliance with her HEP.  12/8/2020. Goal Met.  2. Patient will demonstrate right shoulder ER (0 deg abd) PROM = 40 degrees to improve ease with upper body dressing. Eval: Right Shoulder ER (0 deg abduction) = 15 deg  3. Patient will demonstrate right shoulder flexion and scaption AAROM >/= 90 degrees to improve ease with return to PLOF. Eval; NT     Long Term Goals: To be accomplished in 4 weeks:  1. Patient will demonstrate a significant functional improvement as demonstrated by a score of >/= 57 on FOTO. Eval: FOTO = 47       2. Patient will demonstrate right shoulder flexion and scaption MMT >/= 4/5 to improve ease with household ADLs. Eval: NT per protocol  3. Patient will demonstrate right shoulder functional ER >/= Occiput to improve ease with self-care ADLs.   Eval: NT per protocol    PLAN  [x]  Upgrade activities as tolerated     [x]  Continue plan of care  []  Update interventions per flow sheet       []  Discharge due to:_  []  Other:_      Elaine Monaco PT 12/8/2020  10:12 AM    Future Appointments   Date Time Provider Claire Johnson   12/11/2020 11:00 AM Sherron Bernabe PT [facility]   12/15/2020  8:45 AM TIMA Sanchez [facility]   12/17/2020 11:45 AM [facility]   12/22/2020 10:15 AM TIMA Singh [facility]   12/29/2020 10:15 AM Belinda Lombardi [facility]   12/31/2020  9:30 AM Russell Rosenthal Benjamin Gasca

## 2020-12-11 ENCOUNTER — HOSPITAL ENCOUNTER (OUTPATIENT)
Dept: PHYSICAL THERAPY | Age: 70
Discharge: HOME | End: 2020-12-11
Payer: MEDICARE

## 2020-12-11 PROCEDURE — 97140 MANUAL THERAPY 1/> REGIONS: CPT | Performed by: PHYSICAL THERAPIST

## 2020-12-11 PROCEDURE — 97110 THERAPEUTIC EXERCISES: CPT | Performed by: PHYSICAL THERAPIST

## 2020-12-11 NOTE — PROGRESS NOTES
PT DAILY TREATMENT NOTE     Patient Name: Lina Vee  Date:2020  : 1950  [x]  Patient  Verified  Payor: [place] MEDICARE / Plan: [place] MEDICARE PART A & B / Product Type: Medicare /    In time:10:57  Out time:11:41  Total Treatment Time (min): 44  Visit #: 3 of 8    Medicare/BCBS Only   Total Timed Codes (min):  44 1:1 Treatment Time:  44       Treatment Area: Pain in right shoulder [M25.511]    SUBJECTIVE  Pain Level (0-10 scale): 0  Any medication changes, allergies to medications, adverse drug reactions, diagnosis change, or new procedure performed?: [x] No    [] Yes (see summary sheet for update)  Subjective functional status/changes:   [] No changes reported  Patient denies any shoulder pain. States she is doing her HEP. OBJECTIVE    34 min Therapeutic Exercise:  [] See flow sheet :Emphasis on increasing her AROM and strength. Rationale: increase ROM and increase strength to improve the patients ability to increase ease with household ADLs. 10 min Manual Therapy:  Manual stretching to increase elevation and ER,    The manual therapy interventions were performed at a separate and distinct time from the therapeutic activities interventions. Rationale: increase ROM and increase tissue extensibility to increase ease of motion to improve function. With   [] TE   [] TA   [] neuro   [] other: Patient Education: [x] Review HEP    [] Progressed/Changed HEP based on:   [] positioning   [] body mechanics   [] transfers   [] heat/ice application    [] other:      Other Objective/Functional Measures: Supine AROM flex 0-140, advised to stay below 120. Pain Level (0-10 scale) post treatment: 0    ASSESSMENT/Changes in Function: Patient with increasing AROM right shoulder.     Patient will continue to benefit from skilled PT services to modify and progress therapeutic interventions, address functional mobility deficits, address ROM deficits, address strength deficits, analyze and address soft tissue restrictions, analyze and cue movement patterns, analyze and modify body mechanics/ergonomics and assess and modify postural abnormalities to attain remaining goals. [x]  See Plan of Care  []  See progress note/recertification  []  See Discharge Summary         Progress towards goals / Updated goals:  Short Term Goals: To be accomplished in 2 weeks:  1. Patient will subjectively report full compliance with prescribed HEP. Eval: HEP provided  Current:  Patient reports compliance with her HEP.  12/8/2020. Goal Met.  2. Patient will demonstrate right shoulder ER (0 deg abd) PROM = 40 degrees to improve ease with upper body dressing. Eval: Right Shoulder ER (0 deg abduction) = 15 deg  3. Patient will demonstrate right shoulder flexion and scaption AAROM >/= 90 degrees to improve ease with return to PLOF. Eval; NT     Long Term Goals: To be accomplished in 4 weeks:  1. Patient will demonstrate a significant functional improvement as demonstrated by a score of >/= 57 on FOTO. Eval: FOTO = 47       2. Patient will demonstrate right shoulder flexion and scaption MMT >/= 4/5 to improve ease with household ADLs. Eval: NT per protocol  Current:  AROM flex 0-90; Scaption 0-93.  12/11/2020. Progressing. 3. Patient will demonstrate right shoulder functional ER >/= Occiput to improve ease with self-care ADLs.   Eval: NT per protocol    PLAN  [x]  Upgrade activities as tolerated     [x]  Continue plan of care  []  Update interventions per flow sheet       []  Discharge due to:_  []  Other:_      Luciano Williamson PT 12/11/2020  11:24 AM    Future Appointments   Date Time Provider Claire Johnson   12/15/2020  8:45 AM Thierry Neville, PT [facility]   12/17/2020 11:45 AM Ana Beverly PT [facility]   12/22/2020 10:15 AM Ana Beverly PT [facility]   12/29/2020 10:15 AM Ana Beverly PT [facility]   12/31/2020  9:30 AM Thierry Neville PT [facility]

## 2020-12-15 ENCOUNTER — HOSPITAL ENCOUNTER (OUTPATIENT)
Dept: PHYSICAL THERAPY | Age: 70
Discharge: HOME | End: 2020-12-15
Payer: MEDICARE

## 2020-12-15 PROCEDURE — 97110 THERAPEUTIC EXERCISES: CPT | Performed by: PHYSICAL THERAPIST

## 2020-12-15 PROCEDURE — 97140 MANUAL THERAPY 1/> REGIONS: CPT | Performed by: PHYSICAL THERAPIST

## 2020-12-15 NOTE — PROGRESS NOTES
PT DAILY TREATMENT NOTE     Patient Name: Lavell Spencer  Date:12/15/2020  : 1950  [x]  Patient  Verified  Payor: VA MEDICARE / Plan: VA MEDICARE PART A & B / Product Type: Medicare /    In time:8:38  Out time:9:21  Total Treatment Time (min): 43  Visit #: 4 of 8    Medicare/BCBS Only   Total Timed Codes (min):  43 1:1 Treatment Time:  43       Treatment Area: Pain in right shoulder [M25.511]    SUBJECTIVE  Pain Level (0-10 scale): 0  Any medication changes, allergies to medications, adverse drug reactions, diagnosis change, or new procedure performed?: [x] No    [] Yes (see summary sheet for update)  Subjective functional status/changes:   [] No changes reported  Patient reports no problems with performance of dressing and personal care. OBJECTIVE      33 min Therapeutic Exercise:  [] See flow sheet :Emphasis on increasing her AROM and strength. Rationale: increase ROM and increase strength to improve the patients ability to increase ease with household ADLs. 10 min Manual Therapy:  Manual stretching to increase elevation and ER,    The manual therapy interventions were performed at a separate and distinct time from the therapeutic activities interventions. Rationale: increase ROM and increase tissue extensibility to increase ease of motion to improve function. With   [] TE   [] TA   [] neuro   [] other: Patient Education: [x] Review HEP    [] Progressed/Changed HEP based on:   [] positioning   [] body mechanics   [] transfers   [] heat/ice application    [] other:      Other Objective/Functional Measures:  Right shoulder ER (0 deg abduction) = 25 degrees. AAROM right shoulder flexion 0-135; abd 0-80 degrees. Pain Level (0-10 scale) post treatment: 0    ASSESSMENT/Changes in Function: Patient with improving right shoulder mobility and functional use of her right arm.     Patient will continue to benefit from skilled PT services to modify and progress therapeutic interventions, address functional mobility deficits, address ROM deficits, address strength deficits, analyze and address soft tissue restrictions, analyze and cue movement patterns, analyze and modify body mechanics/ergonomics and assess and modify postural abnormalities to attain remaining goals. [x]  See Plan of Care  []  See progress note/recertification  []  See Discharge Summary         Progress towards goals / Updated goals:  Short Term Goals: To be accomplished in 2 weeks:  1. Patient will subjectively report full compliance with prescribed HEP. Eval: HEP provided  Current:  Patient reports compliance with her HEP.  12/8/2020.  Goal Met.  2. Patient will demonstrate right shoulder ER (0 deg abd) PROM = 40 degrees to improve ease with upper body dressing. Eval: Right Shoulder ER (0 deg abduction) = 15 deg  Current:  Right shoulder ER (0 deg abduction) = 25 degrees. 12/15/2020. Progressing. 3. Patient will demonstrate right shoulder flexion and scaption AAROM >/= 90 degrees to improve ease with return to PLOF. Eval; NT  Current:  AAROM right shoulder flexion 0-135; abd 0-80 degrees. 12/14/2020.     Long Term Goals: To be accomplished in 4 weeks:  1. Patient will demonstrate a significant functional improvement as demonstrated by a score of >/= 57 on FOTO. Eval: FOTO = 47       2. Patient will demonstrate right shoulder flexion and scaption MMT >/= 4/5 to improve ease with household ADLs. Eval: NT per protocol  Current:  MMT deferred. 12/15/2020.  3. Patient will demonstrate right shoulder functional ER >/= Occiput to improve ease with self-care ADLs.   Eval: NT per protocol    PLAN  [x]  Upgrade activities as tolerated     [x]  Continue plan of care  []  Update interventions per flow sheet       []  Discharge due to:_  []  Other:_      Louie Saravia, PT 12/15/2020  8:40 AM    Future Appointments   Date Time Provider Claire Bartoni   12/15/2020  8:45 AM Dominique Cope PT [facility]   12/17/2020 11:45 AM Edy Walker Josemanuel Yuan, [address] [facility]   12/22/2020 10:15 AM Sharron Greer, PT [facility]   12/29/2020 10:15 AM Sharron Greer, PT [facility]   12/31/2020  9:30 AM Tia Khan, PT [facility]

## 2020-12-17 ENCOUNTER — HOSPITAL ENCOUNTER (OUTPATIENT)
Dept: PHYSICAL THERAPY | Age: 70
Discharge: HOME | End: 2020-12-17
Payer: MEDICARE

## 2020-12-17 PROCEDURE — 97112 NEUROMUSCULAR REEDUCATION: CPT

## 2020-12-17 PROCEDURE — 97110 THERAPEUTIC EXERCISES: CPT

## 2020-12-17 NOTE — PROGRESS NOTES
PT DAILY TREATMENT NOTE     Patient Name: Dav Fraga  Date:2020  : 1950  [x]  Patient  Verified  Payor: VA MEDICARE / Plan: VA MEDICARE PART A & B / Product Type: Medicare /    In time:1145  Out time:1231  Total Treatment Time (min): 46  Visit #: 5 of 8    Medicare/BCBS Only   Total Timed Codes (min):  46 1:1 Treatment Time:  46       Treatment Area: Pain in right shoulder [M25.511]    SUBJECTIVE  Pain Level (0-10 scale): 0  Any medication changes, allergies to medications, adverse drug reactions, diagnosis change, or new procedure performed?: [x] No    [] Yes (see summary sheet for update)  Subjective functional status/changes:   [] No changes reported  Patient reports full discharge of use of sling.      OBJECTIVE    26 min Therapeutic Exercise:  [x] See flow sheet : Emphasis placed on improving available shoulder ROM and promotion/maintenance of elbow/wrist/hand AROM and strength    Supine, Right Shoulder Passive ROM - Flexion, Scaption, Abduction   Rationale: increase ROM and increase strength to improve the patients ability to improve ease with self-care ADLs    20 min Neuromuscular Re-education:  [x]  See flow sheet : Emphasis placed on improving activation and recruitment of the glenohumeral and scapulothoracic musculature   Rationale: increase ROM, increase strength and increase proprioception  to improve the patients ability to improve ease with return to PLOF       With   [] TE   [] TA   [] neuro   [] other: Patient Education: [x] Review HEP    [] Progressed/Changed HEP based on:   [] positioning   [] body mechanics   [] transfers   [] heat/ice application    [] other:      Other Objective/Functional Measures:   Right Shoulder AAROM: 0-120 degrees (correct scapulohumeral rhythm)     Pain Level (0-10 scale) post treatment: 0    ASSESSMENT/Changes in Function: With further progression of shoulder AAROM with good tolerance with patient with ability to maintain correct scapulohumeral rhythm with progression. Patient will continue to benefit from skilled PT services to modify and progress therapeutic interventions, address functional mobility deficits, address ROM deficits, address strength deficits, analyze and address soft tissue restrictions, analyze and cue movement patterns, analyze and modify body mechanics/ergonomics, assess and modify postural abnormalities, address imbalance/dizziness and instruct in home and community integration to attain remaining goals. []  See Plan of Care  []  See progress note/recertification  []  See Discharge Summary         Progress towards goals / Updated goals:    Short Term Goals: To be accomplished in 2 weeks:  1. Patient will subjectively report full compliance with prescribed HEP. Eval: HEP provided  Current: Met, Patient reports compliance with her HEP.  12/8/2020  2. Patient will demonstrate right shoulder ER (0 deg abd) PROM = 40 degrees to improve ease with upper body dressing. Eval: Right Shoulder ER (0 deg abduction) = 15 deg  Current: Progressing, Right Shoulder ER (0 deg abduction) = 25 degrees. 12/15/2020  3. Patient will demonstrate right shoulder flexion and scaption AAROM >/= 90 degrees to improve ease with return to PLOF. Eval; NT  Current: Progressing, Right Shoulder Flexion AAROM 0-135; Right Shoulder Scaption AAROM 0-80 degrees. 12/14/2020.     Long Term Goals: To be accomplished in 4 weeks:  1. Patient will demonstrate a significant functional improvement as demonstrated by a score of >/= 57 on FOTO. Eval: FOTO = 47       2. Patient will demonstrate right shoulder flexion and scaption MMT >/= 4/5 to improve ease with household ADLs. Eval: NT per protocol  Current:  MMT deferred. 12/15/2020.  3. Patient will demonstrate right shoulder functional ER >/= Occiput to improve ease with self-care ADLs.   Eval: NT per protocol    PLAN  [x]  Upgrade activities as tolerated     [x]  Continue plan of care  []  Update interventions per flow sheet       []  Discharge due to:_  []  Other:_      Rony Almanzar, PT 12/17/2020  9:57 AM    Future Appointments   Date Time Provider Claire Johnson   12/17/2020 11:45 AM Beatriz Nails, PT [facility]   12/22/2020 10:15 AM Beatriz Nails, PT [facility]   12/29/2020 10:15 AM Beatriz Nails, PT [facility]   12/31/2020  9:30 AM Ulises Rivera PT [facility]

## 2020-12-22 ENCOUNTER — HOSPITAL ENCOUNTER (OUTPATIENT)
Dept: PHYSICAL THERAPY | Age: 70
Discharge: HOME | End: 2020-12-22
Payer: MEDICARE

## 2020-12-22 PROCEDURE — 97110 THERAPEUTIC EXERCISES: CPT

## 2020-12-22 PROCEDURE — 97112 NEUROMUSCULAR REEDUCATION: CPT

## 2020-12-22 NOTE — PROGRESS NOTES
[facility]              [address]        [place], [address]   [phone] fax    Continued Plan of Care/ Re-certification for Physical Therapy Services    Patient name: Issac Spears Start of Care: 12/3/2020   Referral source: Guy Atkins* : 1950   Medical/Treatment Diagnosis: Pain in right shoulder [M25.511]  Payor: Boby Rosass / Plan: VA MEDICARE PART A & B / Product Type: Medicare /  Onset Date:  DoS 2020     Prior Hospitalization: see medical history Provider#: [id number]   Medications: Verified on Patient Summary List     Comorbidities: Right TKA (), Left TKA (), Left Hip SCFE (Surgery x3 - Childhood), Left LE LLD (left LE shorter- Corrected with left FRANCK), Arthritis, Back Pain, BMI>30, HTN, Left FRANCK (DoS 2018), Left TSA (DoS 2018)   Prior Level of Function: (I) Functional ADLs, Retired, Workout (2-3x/week - UE/LE Resistance Regime), Cardio/Mauro Chi, (I) Driving, [address] [facility]  Visits from Start of Care: 6    Missed Visits: 0    The Plan of Care and following information is based on the patient's current status:    Short Term Goals: To be accomplished in 2 weeks:  1. Patient will subjectively report full compliance with prescribed HEP. Eval: HEP provided  Current: Met, Patient reports compliance with her HEP  2. Patient will demonstrate right shoulder ER (0 deg abd) PROM = 40 degrees to improve ease with upper body dressing. Eval: Right Shoulder ER (0 deg abduction) = 15 deg  Current: Progressing, Right Shoulder ER (0 deg abduction) = 25 degrees  3. Patient will demonstrate right shoulder flexion and scaption AAROM >/= 90 degrees to improve ease with return to PLOF. Eval; NT  Current: Progressing, Right Shoulder Flexion AAROM 135 deg, Right Shoulder Scaption AAROM 80 degrees     Long Term Goals: To be accomplished in 4 weeks:  1.  Patient will demonstrate a significant functional improvement as demonstrated by a score of >/= 57 on FOTO. Eval: FOTO = 47   Current: Progressing, FOTO = 53      2. Patient will demonstrate right shoulder flexion and scaption MMT >/= 4/5 to improve ease with household ADLs. Eval: NT per protocol  Current: NT per protocol  3. Patient will demonstrate right shoulder functional ER >/= [place]iput to improve ease with self-care ADLs. Eval: NT per protocol  Current: NT per protocol    Key functional changes: See goals above. Flexion AROM 120 deg, Scaption AROM 88 deg    Problems/ barriers to goal attainment: None. Problem List: pain affecting function, decrease ROM, decrease strength, decrease ADL/ functional abilitiies, decrease activity tolerance, decrease flexibility/ joint mobility and decrease transfer abilities    Treatment Plan: Therapeutic exercise, Therapeutic activities, Neuromuscular re-education, Physical agent/modality, Manual therapy, Patient education, Self Care training, Functional mobility training and Home safety training     Patient Goal (s) has been updated and includes: \"Be able to return back to what I was doing before. \"     Goals for this certification period: Continue with unmet goals above. Frequency / Duration: Patient to be seen 2 times per week for 4 weeks:    Assessment / Recommendations:    Patient has demonstrated appropriate post-surgical progression with progression in accordance with MD protocol and patient tolerance with appropriate post-surgical pain management having been demonstrated. With ability to progress to completion of shoulder AAROM and AROM, with abidance with post-surgical protocol, with good tolerance and ability to maintain correct scapulohumeral mechanics.  To continue to progress in accordance with post-surgical protocol and MD discretion to optimize patient return to PLOF with patient an active adult with desire to return back to regular exercise regime.      Patient will continue to benefit from skilled PT services to modify and progress therapeutic interventions, address functional mobility deficits, address ROM deficits, address strength deficits, analyze and address soft tissue restrictions, analyze and cue movement patterns, analyze and modify body mechanics/ergonomics and assess and modify postural abnormalities to attain remaining goals. Certification Period: 12/22/2020 - 1/20/2020    Peggy Fair, PT 12/22/2020 7:05 AM    ________________________________________________________________________  I certify that the above Therapy Services are being furnished while the patient is under my care. I agree with the treatment plan and certify that this therapy is necessary. [] I have read the above and request that my patient continue as recommended.   [] I have read the above report and request that my patient continue therapy with the following changes/special instructions: _______________________________________  [] I have read the above report and request that my patient be discharged from therapy    Physician's Signature:____________Date:_________TIME:________    ** Signature, Date and Time must be completed for valid certification **  Please sign and return to [facility] [address], [facility] [address]  [phone] fax

## 2020-12-22 NOTE — PROGRESS NOTES
PT DAILY TREATMENT NOTE     Patient Name: Nata Thakur  Date:2020  : 1950  [x]  Patient  Verified  Payor: VA MEDICARE / Plan: VA MEDICARE PART A & B / Product Type: Medicare /    In time:1018  Out time:1110  Total Treatment Time (min): 52  Visit #: 6 of 8    Medicare/BCBS Only   Total Timed Codes (min):  42 1:1 Treatment Time:  42       Treatment Area: Pain in right shoulder [M25.511]    SUBJECTIVE  Pain Level (0-10 scale): 0  Any medication changes, allergies to medications, adverse drug reactions, diagnosis change, or new procedure performed?: [x] No    [] Yes (see summary sheet for update)  Subjective functional status/changes:   [] No changes reported  Patient reports after last treatment session noting some soreness without pain. OBJECTIVE    Modality rationale: decrease inflammation and decrease pain to improve the patients ability to improve ease with sleep   Min Type Additional Details   10 [x]  Ice     []  heat  []  Ice massage Position: Seated  Location: Right Shoulder, Post-Tx     25 min Therapeutic Exercise:  [x]? See flow sheet : Emphasis placed on improving available shoulder ROM and promotion/maintenance of elbow/wrist/hand AROM and strength     Supine, Right Shoulder Passive ROM - Flexion, Scaption, Abduction   Rationale: increase ROM and increase strength to improve the patients ability to improve ease with self-care ADLs     17 min Neuromuscular Re-education:  [x]?   See flow sheet : Emphasis placed on improving activation and recruitment of the glenohumeral and scapulothoracic musculature   Rationale: increase ROM, increase strength and increase proprioception  to improve the patients ability to improve ease with return to PLOF                                With   [] TE   [] TA   [] neuro   [] other: Patient Education: [x] Review HEP    [] Progressed/Changed HEP based on:   [] positioning   [] body mechanics   [] transfers   [] heat/ice application    [] other:      Other Objective/Functional Measures: See goals below. Flexion AROM 120 deg, Scaption AROM 88 deg    Pain Level (0-10 scale) post treatment: 0    ASSESSMENT/Changes in Function: Patient has demonstrated appropriate post-surgical progression with progression in accordance with MD protocol and patient tolerance with appropriate post-surgical pain management having been demonstrated. With ability to progress to completion of shoulder AAROM and AROM, with abidance with post-surgical protocol, with good tolerance and ability to maintain correct scapulohumeral mechanics. To continue to progress in accordance with post-surgical protocol and MD discretion to optimize patient return to PLOF with patient an active adult with desire to return back to regular exercise regime. Patient will continue to benefit from skilled PT services to modify and progress therapeutic interventions, address functional mobility deficits, address ROM deficits, address strength deficits, analyze and address soft tissue restrictions, analyze and cue movement patterns, analyze and modify body mechanics/ergonomics and assess and modify postural abnormalities to attain remaining goals. []  See Plan of Care  [x]  See progress note/recertification  []  See Discharge Summary         Progress towards goals / Updated goals:    Short Term Goals: To be accomplished in 2 weeks:  1. Patient will subjectively report full compliance with prescribed HEP. Eval: HEP provided  Current: Met, Patient reports compliance with her HEP.  12/8/2020  2. Patient will demonstrate right shoulder ER (0 deg abd) PROM = 40 degrees to improve ease with upper body dressing. Eval: Right Shoulder ER (0 deg abduction) = 15 deg  Current: Progressing, Right Shoulder ER (0 deg abduction) = 25 degrees.  12/15/2020  3. Patient will demonstrate right shoulder flexion and scaption AAROM >/= 90 degrees to improve ease with return to PLOF.   Eval; NT  Current: Progressing, Right Shoulder Flexion AAROM 135 deg, Right Shoulder Scaption AAROM 80 degrees, 12/14/2020.     Long Term Goals: To be accomplished in 4 weeks:  1. Patient will demonstrate a significant functional improvement as demonstrated by a score of >/= 57 on FOTO. Eval: FOTO = 47   Current: Progressing, FOTO = 53, 12/22/2020      2. Patient will demonstrate right shoulder flexion and scaption MMT >/= 4/5 to improve ease with household ADLs. Eval: NT per protocol  3. Patient will demonstrate right shoulder functional ER >/= Occiput to improve ease with self-care ADLs.   Eval: NT per protocol    PLAN  [x]  Upgrade activities as tolerated     [x]  Continue plan of care  []  Update interventions per flow sheet       []  Discharge due to:_  []  Other:_      Vanessa Delatorre, PT 12/22/2020  7:04 AM    Future Appointments   Date Time Provider Claire Johnson   12/22/2020 10:15 AM Jessica Lights, PT [facility]   12/29/2020 10:15 AM [facility] Lights, [facility]   12/31/2020  9:30 AM Netta Brewer, PT [facility]

## 2020-12-29 ENCOUNTER — HOSPITAL ENCOUNTER (OUTPATIENT)
Dept: PHYSICAL THERAPY | Age: 70
Discharge: HOME | End: 2020-12-29
Payer: MEDICARE

## 2020-12-29 PROCEDURE — 97112 NEUROMUSCULAR REEDUCATION: CPT

## 2020-12-29 PROCEDURE — 97110 THERAPEUTIC EXERCISES: CPT

## 2020-12-29 NOTE — PROGRESS NOTES
PT DAILY TREATMENT NOTE     Patient Name: Sylvia Gonzalez  Date:2020  : 1950  [x]  Patient  Verified  Payor: [place] MED[place]ARE / Plan: [place] MED[place]ARE PART A & B / Product Type: Medicare /    In time:1020  Out time:1103  Total Treatment Time (min): 43  Visit #: 1 of 8    Medicare/BCBS Only   Total Timed Codes (min):  33 1:1 Treatment Time:  33       Treatment Area: Pain in right shoulder [M25.511]    SUBJECTIVE  Pain Level (0-10 scale): 0  Any medication changes, allergies to medications, adverse drug reactions, diagnosis change, or new procedure performed?: [x] No    [] Yes (see summary sheet for update)  Subjective functional status/changes:   [] No changes reported  Patient reports MD follow up with MD pleased with progress having been demonstrated. Patient denies post-session soreness after last treatment session with patient contributing to ice application at end of treatment session. OBJECTIVE    Modality rationale: decrease inflammation and decrease pain to improve the patients ability to improve ease with sleep   Min Type Additional Details    10 [x]? Ice     []?  heat  []? Ice massage Position: Seated  Location: Right Shoulder, Post-Tx       20 min Therapeutic Exercise:  [x]? ? See flow sheet : Emphasis placed on improving available shoulder ROM and promotion/maintenance of elbow/wrist/hand AROM and strength     Supine, Right Shoulder Passive ROM - Flexion, Scaption, Abduction   Rationale: increase ROM and increase strength to improve the patients ability to improve ease with self-care ADLs     13 min Neuromuscular Re-education:  [x]? ?  See flow sheet : Emphasis placed on improving activation and recruitment of the glenohumeral and scapulothoracic musculature   Rationale: increase ROM, increase strength and increase proprioception  to improve the patients ability to improve ease with return to PLOF          With   [] TE   [] TA   [] neuro   [] other: Patient Education: [x] Review HEP    [] Progressed/Changed HEP based on:   [] positioning   [] body mechanics   [] transfers   [] heat/ice application    [] other:      Other Objective/Functional Measures:   Right Shoulder Flexion AROM 124 deg, Right Shoulder Scaption 100 deg     Pain Level (0-10 scale) post treatment: 0    ASSESSMENT/Changes in Function: With further progression of shoulder AROM/AAROM exercises to aid in further promotion of ease with completion of recreational ADLs. With hold of manual therapy with greater emphasis placed on progressive strengthening with patient to be reduced to 1x/week frequency next week in preparation for discharge with continuation of independent progressive exercise regime. Patient will continue to benefit from skilled PT services to modify and progress therapeutic interventions, address functional mobility deficits, address ROM deficits, address strength deficits, analyze and address soft tissue restrictions, analyze and cue movement patterns, analyze and modify body mechanics/ergonomics and assess and modify postural abnormalities to attain remaining goals. []  See Plan of Care  []  See progress note/recertification  []  See Discharge Summary         Progress towards goals / Updated goals:    Short Term Goals: To be accomplished in 2 weeks:  1. Patient will subjectively report full compliance with prescribed HEP. At PN: Met, Patient reports compliance with her HEP.  12/8/2020  2. Patient will demonstrate right shoulder ER (0 deg abd) PROM = 40 degrees to improve ease with upper body dressing. At PN: Progressing, Right Shoulder ER (0 deg abduction) = 25 degrees.  12/15/2020  3. Patient will demonstrate right shoulder flexion and scaption AAROM >/= 90 degrees to improve ease with return to PLOF. At PN: Progressing, Right Shoulder Flexion AAROM 135 deg, Right Shoulder Scaption AAROM 80 degrees, 12/14/2020.     Long Term Goals: To be accomplished in 4 weeks:  1.  Patient will demonstrate a significant functional improvement as demonstrated by a score of >/= 57 on FOTO. At PN: Michael Foster = 53, 12/22/2020      2. Patient will demonstrate right shoulder flexion and scaption MMT >/= 4/5 to improve ease with household ADLs. Eval: NT per protocol  3. Patient will demonstrate right shoulder functional ER >/= [place] to improve ease with self-care ADLs.   Eval: NT per protocol  Current: Met, Right Shoulder Functional ER = [place], 12/29/2020       PLAN  [x]  Upgrade activities as tolerated     [x]  Continue plan of care  []  Update interventions per flow sheet       []  Discharge due to:_  []  Other:_      Jarek Sepulveda, PT 12/29/2020  7:05 AM    Future Appointments   Date Time Provider Claire Johnson   12/29/2020 10:15 AM Nikolay k [facility]   12/31/2020  9:30 AM Diamond Monroe PT [facility]

## 2020-12-31 ENCOUNTER — APPOINTMENT (OUTPATIENT)
Dept: PHYSICAL THERAPY | Age: 70
End: 2020-12-31
Payer: MEDICARE

## 2021-01-08 ENCOUNTER — HOSPITAL ENCOUNTER (OUTPATIENT)
Dept: PHYSICAL THERAPY | Age: 71
Discharge: HOME | End: 2021-01-08
Payer: MEDICARE

## 2021-01-08 PROCEDURE — 97110 THERAPEUTIC EXERCISES: CPT

## 2021-01-08 PROCEDURE — 97112 NEUROMUSCULAR REEDUCATION: CPT

## 2021-01-08 NOTE — PROGRESS NOTES
PT DAILY TREATMENT NOTE     Patient Name: Mehdi Lombardi  Date:2021  : 1950  [x]  Patient  Verified  Payor: [place] MEDICARE / Plan: [place] MEDICARE PART A & B / Product Type: Medicare /    In time:1146  Out time:1234  Total Treatment Time (min): 48  Visit #: 2 of 8    Medicare/BCBS Only   Total Timed Codes (min):  38 1:1 Treatment Time:  38       Treatment Area: Pain in right shoulder [M25.511]    SUBJECTIVE  Pain Level (0-10 scale): 0  Any medication changes, allergies to medications, adverse drug reactions, diagnosis change, or new procedure performed?: [x] No    [] Yes (see summary sheet for update)  Subjective functional status/changes:   [] No changes reported  Patient reports with ability to increase home exercises to 2 sets without issues. OBJECTIVE    Modality rationale: decrease inflammation and decrease pain to improve the patients ability to improve ease with sleep   Min Type Additional Details     10 [x]? ?  Ice     []? ?  heat  []? ?  Ice massage Position: Seated  Location: Right Shoulder, Post-Tx        12 min Therapeutic Exercise:  [x]? ?? See flow sheet : Emphasis placed on improving available shoulder ROM and promotion/maintenance of elbow/wrist/hand AROM and strength   Rationale: increase ROM and increase strength to improve the patients ability to improve ease with self-care ADLs     26 min Neuromuscular Re-education:  [x]? ??  See flow sheet : Emphasis placed on improving activation and recruitment of the glenohumeral and scapulothoracic musculature   Rationale: increase ROM, increase strength and increase proprioception  to improve the patients ability to improve ease with return to PLOF        With   [] TE   [] TA   [] neuro   [] other: Patient Education: [x] Review HEP    [] Progressed/Changed HEP based on:   [] positioning   [] body mechanics   [] transfers   [] heat/ice application    [] other:      Other Objective/Functional Measures:   Right Shoulder Flexion AROM 130 deg, Right Shoulder Scaption 117 deg      Pain Level (0-10 scale) post treatment: 0    ASSESSMENT/Changes in Function: With continued appropriate post-surgical improvement with continued utilization of an HEP-based program to allow for independent progression post-surgically. Patient provided with updated HEP with progressions verbalized and written instructions provided. Patient will continue to benefit from skilled PT services to modify and progress therapeutic interventions, address functional mobility deficits, address ROM deficits, address strength deficits, analyze and address soft tissue restrictions, analyze and cue movement patterns, analyze and modify body mechanics/ergonomics and assess and modify postural abnormalities to attain remaining goals. []  See Plan of Care  []  See progress note/recertification  []  See Discharge Summary         Progress towards goals / Updated goals:    Short Term Goals: To be accomplished in 2 weeks:  1. Patient will subjectively report full compliance with prescribed HEP. At PN: Met, Patient reports compliance with her HEP.  12/8/2020  2. Patient will demonstrate right shoulder ER (0 deg abd) PROM = 40 degrees to improve ease with upper body dressing. At PN: Progressing, Right Shoulder ER (0 deg abduction) = 25 degrees.  12/15/2020  3. Patient will demonstrate right shoulder flexion and scaption AAROM >/= 90 degrees to improve ease with return to PLOF. At PN: Progressing, Right Shoulder Flexion AAROM 135 deg, Right Shoulder Scaption AAROM 80 degrees, 12/14/2020.     Long Term Goals: To be accomplished in 4 weeks:  1. Patient will demonstrate a significant functional improvement as demonstrated by a score of >/= 57 on FOTO. At PN: Farida Izquierdoman = 53, 12/22/2020      2. Patient will demonstrate right shoulder flexion and scaption MMT >/= 4/5 to improve ease with household ADLs.   Eval: NT per protocol   Current: Progressing, Right Shoulder Flexion MMT 4/5, Right Shoulder Scaption MMT 3+/5, 1/8/2021  3. Patient will demonstrate right shoulder functional ER >/= [place] to improve ease with self-care ADLs.   Eval: NT per protocol  Current: Met, Right Shoulder Functional ER = [place], 12/29/2020    PLAN  [x]  Upgrade activities as tolerated     [x]  Continue plan of care  []  Update interventions per flow sheet       []  Discharge due to:_  []  Other:_      Georgette Patterson, PT 1/8/2021  7:48 AM    Future Appointments   Date Time Provider Claire Johnson   1/8/2021 11:45 AM Tiffany Wynne [facility]   1/15/2021 11:45 AM Giana Roman, PT [facility]   1/22/2021 11:00 AM Shari Wynne, PT [facility]

## 2021-01-15 ENCOUNTER — HOSPITAL ENCOUNTER (OUTPATIENT)
Dept: PHYSICAL THERAPY | Age: 71
Discharge: HOME | End: 2021-01-15
Payer: MEDICARE

## 2021-01-15 PROCEDURE — 97110 THERAPEUTIC EXERCISES: CPT

## 2021-01-15 PROCEDURE — 97112 NEUROMUSCULAR REEDUCATION: CPT

## 2021-01-15 NOTE — PROGRESS NOTES
PT DAILY TREATMENT NOTE     Patient Name: Bhanu Henriquez  Date:1/15/2021  : 1950  [x]  Patient  Verified  Payor: [place] MEDICARE / Plan: [place] MEDICARE PART A & B / Product Type: Medicare /    In time:1145  Out time:1234  Total Treatment Time (min): 52  Visit #: 3 of 8    Medicare/BCBS Only   Total Timed Codes (min):  39 1:1 Treatment Time:  39       Treatment Area: Pain in right shoulder [M25.511]    SUBJECTIVE  Pain Level (0-10 scale): 0  Any medication changes, allergies to medications, adverse drug reactions, diagnosis change, or new procedure performed?: [x] No    [] Yes (see summary sheet for update)  Subjective functional status/changes:   [] No changes reported  Patient reports at home with progression of HEP with performance of 20-30 repetitions per exercise with maintenance of correct mechanics. OBJECTIVE    Modality rationale: decrease inflammation and decrease pain to improve the patients ability to improve ease with sleep   Min Type Additional Details     10 [x]? ??  Ice     []? ??  heat  []? ??  Ice massage Position: Seated  Location: Right Shoulder, Post-Tx        15 min Therapeutic Exercise:  [x]? ??? See flow sheet : Emphasis placed on improving available shoulder ROM and promotion/maintenance of elbow/wrist/hand AROM and strength   Rationale: increase ROM and increase strength to improve the patients ability to improve ease with self-care ADLs     24 min Neuromuscular Re-education:  [x]? ???  See flow sheet : Emphasis placed on improving activation and recruitment of the glenohumeral and scapulothoracic musculature   Rationale: increase ROM, increase strength and increase proprioception  to improve the patients ability to improve ease with return to PLOF        With   [] TE   [] TA   [] neuro   [] other: Patient Education: [x] Review HEP    [] Progressed/Changed HEP based on:   [] positioning   [] body mechanics   [] transfers   [] heat/ice application    [] other:      Other Objective/Functional Measures:   Right Shoulder Flexion MMT 4/5, Right Shoulder Scaption MMT 4+/5     Pain Level (0-10 scale) post treatment: 0    ASSESSMENT/Changes in Function: With further prescription of updated HEP with possible discharge following completion of next scheduled treatment session with determination to be made based upon patient comfort level with continuation of prescribed HEP with independent progression. With ability to further progress resistance utilized with good tolerance and with ability to maintain correct scapulohumeral mechanics. Patient will continue to benefit from skilled PT services to modify and progress therapeutic interventions, address functional mobility deficits, address ROM deficits, address strength deficits, analyze and address soft tissue restrictions, analyze and cue movement patterns, analyze and modify body mechanics/ergonomics and assess and modify postural abnormalities to attain remaining goals. []  See Plan of Care  []  See progress note/recertification  []  See Discharge Summary         Progress towards goals / Updated goals:    Short Term Goals: To be accomplished in 2 weeks:  1. Patient will subjectively report full compliance with prescribed HEP. At PN: Met, Patient reports compliance with her HEP.  12/8/2020  2. Patient will demonstrate right shoulder ER (0 deg abd) PROM = 40 degrees to improve ease with upper body dressing. At PN: Progressing, Right Shoulder ER (0 deg abduction) = 25 degrees.  12/15/2020  3. Patient will demonstrate right shoulder flexion and scaption AAROM >/= 90 degrees to improve ease with return to PLOF. At PN: Progressing, Right Shoulder Flexion AAROM 135 deg, Right Shoulder Scaption AAROM 80 degrees, 12/14/2020.     Long Term Goals: To be accomplished in 4 weeks:  1. Patient will demonstrate a significant functional improvement as demonstrated by a score of >/= 57 on FOTO. At PN: Uday Reddy = 53, 12/22/2020      2. Patient will demonstrate right shoulder flexion and scaption MMT >/= 4/5 to improve ease with household ADLs. Eval: NT per protocol   Current: Met, Right Shoulder Flexion MMT 4/5, Right Shoulder Scaption MMT 4+/5, 1/15/2021  3. Patient will demonstrate right shoulder functional ER >/= [place] to improve ease with self-care ADLs.   Eval: NT per protocol  Current: Met, Right Shoulder Functional ER = [place], 12/29/2020    PLAN  [x]  Upgrade activities as tolerated     [x]  Continue plan of care  []  Update interventions per flow sheet       []  Discharge due to:_  []  Other:_      Vince Byrnes, PT 1/15/2021  7:53 AM    Future Appointments   Date Time Provider Claire Johnson   1/15/2021 11:45 AM Darrell Amaro, PT [facility]   1/22/2021 11:00 AM Shyla Valles, PT [facility]

## 2021-01-22 ENCOUNTER — HOSPITAL ENCOUNTER (OUTPATIENT)
Dept: PHYSICAL THERAPY | Age: 71
Discharge: HOME | End: 2021-01-22
Payer: MEDICARE

## 2021-01-22 PROCEDURE — 97110 THERAPEUTIC EXERCISES: CPT

## 2021-01-22 PROCEDURE — 97112 NEUROMUSCULAR REEDUCATION: CPT

## 2021-01-22 NOTE — PROGRESS NOTES
[facility]              [address]        [place], [address] [place]   [phone] fax    Continued Plan of Care/ Re-certification for Physical Therapy Services    Patient name: Aubrey Bryson Start of Care: 12/3/2020   Referral source: Debbi Cesar* : 1950   Medical/Treatment Diagnosis: Pain in right shoulder [M25.511]  Payor: Graham Casper / Plan: [place] MEDICARE PART A & B / Product Type: Medicare /  Onset Date:  DoS 2020     Prior Hospitalization: see medical history Provider#: [id number]   Medications: Verified on Patient Summary List     Comorbidities: Right TKA (), Left TKA (), Left Hip SCFE (Surgery x3 - Childhood), Left LE LLD (left LE shorter- Corrected with left FRANCK), Arthritis, Back Pain, BMI>30, HTN, Left FRANCK (DoS 2018), Left TSA (DoS 2018)   Prior Level of Function: (I) Functional ADLs, Retired, [address] (2-3x/week - UE/LE Resistance Regime), Cardio/Mauro Chi, (I) Driving, [address] [facility] [facility]  Visits from Start of Care: 10    Missed Visits: 1    The Plan of Care and following information is based on the patient's current status:    Short Term Goals: To be accomplished in 2 weeks:  1. Patient will subjectively report full compliance with prescribed HEP. At Last PN: Met, Patient reports compliance with her HEP  2. Patient will demonstrate right shoulder ER (0 deg abd) PROM = 40 degrees to improve ease with upper body dressing. At Last PN: Progressing, Right Shoulder ER (0 deg abduction) = 25 degrees  Current: Met, Right Shoulder ER (0 deg abduction) = 40 degrees  3. Patient will demonstrate right shoulder flexion and scaption AAROM >/= 90 degrees to improve ease with return to PLOF.   At Last PN: Progressing, Right Shoulder Flexion AAROM 135 deg, Right Shoulder Scaption AAROM 80 degrees  Current: Met, Right Shoulder Flexion AAROM 145 deg, Right Shoulder Scaption AAROM 130 degrees     Long Term Goals: To be accomplished in 4 weeks:  1. Patient will demonstrate a significant functional improvement as demonstrated by a score of >/= 57 on FOTO. At Last PN: Progressing, FOTO = 53    Current: Met, FOTO = FOTO = 61  2. Patient will demonstrate right shoulder flexion and scaption MMT >/= 4/5 to improve ease with household ADLs. Eval: NT per protocol   Current: Met, Right Shoulder Flexion MMT 4+/5, Right Shoulder Scaption MMT 4+/5  3. Patient will demonstrate right shoulder functional ER >/= [place] to improve ease with self-care ADLs. Eval: NT per protocol  Current: Met, Right Shoulder Functional ER = [place]    Key functional changes: See goals above. Problems/ barriers to goal attainment: None. Problem List: pain affecting function, decrease ROM, decrease strength, decrease ADL/ functional abilitiies, decrease activity tolerance, decrease flexibility/ joint mobility and decrease transfer abilities    Treatment Plan: Therapeutic exercise, Therapeutic activities, Neuromuscular re-education, Physical agent/modality, Manual therapy, Patient education, Self Care training, Functional mobility training and Home safety training     Patient Goal (s) has been updated and includes: \"Get back to 100%. \"     Goals for this certification period: To be completed in 4 weeks  1. Patient will be independent with prescribed home exercise program to ensure success upon discharge. 1/22/2021: Not provided  2. Patient will demonstrate right shoulder flexion MMT 5/5 to improve ease with overhead lifting.  1/22/2021: Right Shoulder Flexion MMT = 4+/5    Frequency / Duration: Patient to be seen 1 time, in 3-4 weeks    Assessment / Recommendations: At this time patient has met all created therapeutic goals with patient having demonstrated appropriate independent progression with utilization of progressive HEP.  Patient has been able to initiate modified independent exercise regime with good tolerance with patient prior to surgery with performance of resistance regime 2-3x/week. With plans to follow up with patient in next 3-4 weeks at which time with planned discharge, as appropriate, with continuation of independent home exercise program to allow for independent progression post-discharge.       Patient will continue to benefit from skilled PT services to modify and progress therapeutic interventions, address functional mobility deficits, address ROM deficits, address strength deficits, analyze and address soft tissue restrictions, analyze and cue movement patterns, analyze and modify body mechanics/ergonomics and assess and modify postural abnormalities to attain remaining goals. Certification Period: 1/22/2021 - 2/20/2021    Eddy Zavala, PT 1/22/2021 7:48 AM    ________________________________________________________________________  I certify that the above Therapy Services are being furnished while the patient is under my care. I agree with the treatment plan and certify that this therapy is necessary. [] I have read the above and request that my patient continue as recommended.   [] I have read the above report and request that my patient continue therapy with the following changes/special instructions: _______________________________________  [] I have read the above report and request that my patient be discharged from therapy    Physician's Signature:____________Date:_________TIME:________    ** Signature, Date and Time must be completed for valid certification **  Please sign and return to [facility]              [address] [place], [address] [employer]   [phone] fax

## 2021-01-22 NOTE — PROGRESS NOTES
PT DAILY TREATMENT NOTE     Patient Name: Clniton Pacheco  Date:2021  : 1950  [x]  Patient  Verified  Payor: [place] MED[place]ARE / Plan: [place] MEDICARE PART A & B / Product Type: Medicare /    In time:1100  Out time:1150  Total Treatment Time (min): 50  Visit #: 4 of 8    Medicare/BCBS Only   Total Timed Codes (min):  40 1:1 Treatment Time:  40       Treatment Area: Pain in right shoulder [M25.511]    SUBJECTIVE  Pain Level (0-10 scale): 0  Any medication changes, allergies to medications, adverse drug reactions, diagnosis change, or new procedure performed?: [x] No    [] Yes (see summary sheet for update)  Subjective functional status/changes:   [] No changes reported  Patient reports no adverse response to last treatment session. OBJECTIVE    Modality rationale: decrease inflammation and decrease pain to improve the patients ability to improve ease with sleep   Min Type Additional Details     10 [x]? ???  Ice     []????  heat  []????  Ice massage Position: Seated  Location: Right Shoulder, Post-Tx        15 min Therapeutic Exercise:  [x]? ???? See flow sheet : Emphasis placed on improving available shoulder ROM and promotion/maintenance of elbow/wrist/hand AROM and strength   Rationale: increase ROM and increase strength to improve the patients ability to improve ease with self-care ADLs     25 min Neuromuscular Re-education:  [x]? ????  See flow sheet : Emphasis placed on improving activation and recruitment of the glenohumeral and scapulothoracic musculature   Rationale: increase ROM, increase strength and increase proprioception  to improve the patients ability to improve ease with return to PLOF        With   [] TE   [] TA   [] neuro   [] other: Patient Education: [x] Review HEP    [] Progressed/Changed HEP based on:   [] positioning   [] body mechanics   [] transfers   [] heat/ice application    [] other:      Other Objective/Functional Measures:   AROM: Flexion 145 deg, Abduction 133 deg     Pain Level (0-10 scale) post treatment: 2    ASSESSMENT/Changes in Function: At this time patient has met all created therapeutic goals with patient having demonstrated appropriate independent progression with utilization of progressive HEP. Patient has been able to initiate modified independent exercise regime with good tolerance with patient prior to surgery with performance of resistance regime 2-3x/week. With plans to follow up with patient in next 3-4 weeks at which time with planned discharge, as appropriate, with continuation of independent home exercise program to allow for independent progression post-discharge. Patient will continue to benefit from skilled PT services to modify and progress therapeutic interventions, address functional mobility deficits, address ROM deficits, address strength deficits, analyze and address soft tissue restrictions, analyze and cue movement patterns, analyze and modify body mechanics/ergonomics and assess and modify postural abnormalities to attain remaining goals. []  See Plan of Care  [x]  See progress note/recertification  []  See Discharge Summary         Progress towards goals / Updated goals:    Short Term Goals: To be accomplished in 2 weeks:  1. Patient will subjectively report full compliance with prescribed HEP. At PN: Met, Patient reports compliance with her HEP.  12/8/2020  2. Patient will demonstrate right shoulder ER (0 deg abd) PROM = 40 degrees to improve ease with upper body dressing. At PN: Progressing, Right Shoulder ER (0 deg abduction) = 25 degrees.  12/15/2020  Current: Met, Right Shoulder ER (0 deg abduction) = 40 degrees, 1/22/2021  3. Patient will demonstrate right shoulder flexion and scaption AAROM >/= 90 degrees to improve ease with return to PLOF. At PN: Progressing, Right Shoulder Flexion AAROM 135 deg, Right Shoulder Scaption AAROM 80 degrees, 12/14/2020.   Current: Met, Right Shoulder Flexion AAROM 145 deg, Right Shoulder Scaption AAROM 130 degrees, 1/22/2021     Long Term Goals: To be accomplished in 4 weeks:  1. Patient will demonstrate a significant functional improvement as demonstrated by a score of >/= 57 on FOTO. At PN: Progressing, FOTO = 53, 12/22/2020       Current: Met, FOTO = FOTO = 61, 1/22/2021  2. Patient will demonstrate right shoulder flexion and scaption MMT >/= 4/5 to improve ease with household ADLs. Eval: NT per protocol   Current: Met, Right Shoulder Flexion MMT 4+/5, Right Shoulder Scaption MMT 4+/5, 1/22/2021  3. Patient will demonstrate right shoulder functional ER >/= Occiput to improve ease with self-care ADLs. Eval: NT per protocol  Current: Met, Right Shoulder Functional ER = Occiput, 12/29/2020    Updated Goals: To be completed in 4 weeks  1. Patient will be independent with prescribed home exercise program to ensure success upon discharge. 1/22/2021: Not provided  2.  Patient will demonstrate right shoulder flexion MMT 5/5 to improve ease with overhead lifting.  1/22/2021: Right Shoulder Flexion MMT = 4+/5    PLAN  []  Upgrade activities as tolerated     []  Continue plan of care  []  Update interventions per flow sheet       []  Discharge due to:_  []  Other:_      Madina Duong, PT 1/22/2021  7:48 AM    Future Appointments   Date Time Provider Claire Johnson   1/22/2021 11:00 AM Wesley Valles, PT [facility]

## 2021-02-02 ENCOUNTER — TRANSCRIBE ORDER (OUTPATIENT)
Dept: SCHEDULING | Age: 71
End: 2021-02-02

## 2021-02-02 DIAGNOSIS — S66.811A HAND AND WRIST EXTENSOR TENDON RUPTURE, RIGHT, INITIAL ENCOUNTER: Primary | ICD-10-CM

## 2021-02-09 ENCOUNTER — HOSPITAL ENCOUNTER (OUTPATIENT)
Dept: MRI IMAGING | Age: 71
Discharge: HOME | End: 2021-02-09
Payer: MEDICARE

## 2021-02-09 DIAGNOSIS — S66.811A HAND AND WRIST EXTENSOR TENDON RUPTURE, RIGHT, INITIAL ENCOUNTER: ICD-10-CM

## 2021-02-09 PROCEDURE — 73221 MRI JOINT UPR EXTREM W/O DYE: CPT

## 2021-02-09 PROCEDURE — 73218 MRI UPPER EXTREMITY W/O DYE: CPT

## 2021-02-10 ENCOUNTER — HOSPITAL ENCOUNTER (OUTPATIENT)
Dept: PHYSICAL THERAPY | Age: 71
Discharge: HOME | End: 2021-02-10
Payer: MEDICARE

## 2021-02-10 PROCEDURE — 97110 THERAPEUTIC EXERCISES: CPT

## 2021-02-10 PROCEDURE — 97112 NEUROMUSCULAR REEDUCATION: CPT

## 2021-02-10 NOTE — PROGRESS NOTES
[facility]              [address]        [place], [address]   [phone] fax      Discharge Summary  Patient name: Aubrey Bryson Start of Care: 12/3/2020   Referral source: Debbi Cesar* : 1950   Medical/Treatment Diagnosis: Pain in right shoulder [M25.511]  Payor: [employer] / Plan: [place] MEDICARE PART A & B / Product Type: Medicare /  Onset Date:  DoS 2020     Prior Hospitalization: see medical history Provider#: [id number]   Medications: Verified on Patient Summary List     Comorbidities: Right TKA (), Left TKA (), Left Hip SCFE (Surgery x3 - Childhood), Left LE LLD (left LE shorter- Corrected with left FRANCK), Arthritis, Back Pain, BMI>30, HTN, Left FRANCK (DoS 2018), Left TSA (DoS 2018)   Prior Level of Function: (I) Functional ADLs, Retired, [address] (2-3x/week - UE/LE Resistance Regime), Cardio/Mauro Chi, (I) Driving, [address] [facility]  Visits from Start of Care: 11    Missed Visits: 0  Reporting Period : 2021 to 2/10/2021      Summary of Care:    Short Term Goals: To be accomplished in 2 weeks:  1. Patient will subjectively report full compliance with prescribed HEP. At Last PN: Met, Patient reports compliance with her HEP  2. Patient will demonstrate right shoulder ER (0 deg abd) PROM = 40 degrees to improve ease with upper body dressing. At Last PN: Met, Right Shoulder ER (0 deg abduction) = 40 degrees  3. Patient will demonstrate right shoulder flexion and scaption AAROM >/= 90 degrees to improve ease with return to PLOF. At Last PN: Met, Right Shoulder Flexion AAROM 145 deg, Right Shoulder Scaption AAROM 130 degrees     Long Term Goals: To be accomplished in 4 weeks:  1. Patient will demonstrate a significant functional improvement as demonstrated by a score of >/= 57 on FOTO. At Last PN: Met, FOTO = FOTO = 61  2.  Patient will demonstrate right shoulder flexion and scaption MMT >/= 4/5 to improve ease with household ADLs. At Last PN: Met, Right Shoulder Flexion MMT 4+/5, Right Shoulder Scaption MMT 4+/5  3. Patient will demonstrate right shoulder functional ER >/= Occiput to improve ease with self-care ADLs. At Last PN: Met, Right Shoulder Functional ER = Occiput    Goals for this certification period: To be completed in 4 weeks  1. Patient will be independent with prescribed home exercise program to ensure success upon discharge. At Last PN: Not provided  Current: Met, Patient independent with prescribed HEP in preparation for discharge  2. Patient will demonstrate right shoulder flexion MMT 5/5 to improve ease with overhead lifting. At Last PN: Right Shoulder Flexion MMT = 4+/5  Current: Met, Shoulder Flexion MMT = 5/5    ASSESSMENT/RECOMMENDATIONS:    At this time patient suitable for discharge with patient having met most created therapeutic goals and independent with progressive HEP to allow for independent progression post-discharge. Patient has initiated independent workout regime, with modification, with patient subjectively reporting comfort with independent progressive HEP. Patient demonstrates right shoulder AROM and strength WFL with patient denying functional limitations secondary to the right shoulder.      [x]Discontinue therapy: [x]Patient has reached or is progressing toward set goals      []Patient is non-compliant or has abdicated      []Due to lack of appreciable progress towards set goals    Joe Perez, PT 2/10/2021 8:37 AM

## 2021-02-10 NOTE — PROGRESS NOTES
Physical Therapy Discharge Instructions      [facility]   [address]   [place], [address]   [phone] fax    Patient: Tigist Alarcon  : 1950      Continue Home Exercise Program 4-5 times per week      Continue with    [x] Ice  as needed      [] Heat           Follow up with MD:     [x] Upon completion of therapy / As scheduled     [] As needed      Recommendations:     [x]   Return to activity with home program    []   Return to activity with the following modifications:       []Post Rehab Program    []Join Independent aquatic program     []Return to/join local gym        Additional Comments: Please follow up with MD as instructed. Continue with prescribed home exercise program with progression as instructed. If you have any questions please contact the clinic at the number above.           Solange Gray, PT 2/10/2021 10:44 AM

## 2021-02-10 NOTE — PROGRESS NOTES
PT DAILY TREATMENT NOTE     Patient Name: Sherron Leroy  Date:2/10/2021  : 1950  [x]  Patient  Verified  Payor: [place] MEDICARE / Plan: [place] MEDICARE PART A & B / Product Type: Medicare /    In NYQX:[id number]  Out time:1225  Total Treatment Time (min): 40  Visit #: 1 of 1    Medicare/BCBS Only   Total Timed Codes (min):  40 1:1 Treatment Time:  40       Treatment Area: Pain in right shoulder [M25.511]    SUBJECTIVE  Pain Level (0-10 scale): 0  Any medication changes, allergies to medications, adverse drug reactions, diagnosis change, or new procedure performed?: [x] No    [] Yes (see summary sheet for update)  Subjective functional status/changes:   [] No changes reported  Patient reports agreement with discharge today. OBJECTIVE    15 min Therapeutic Exercise:  [x]?????? See flow sheet : Emphasis placed on improving available shoulder ROM and promotion/maintenance of elbow/wrist/hand AROM and strength   Rationale: increase ROM and increase strength to improve the patients ability to improve ease with self-care ADLs     25 min Neuromuscular Re-education:  [x]??????  See flow sheet : Emphasis placed on improving activation and recruitment of the glenohumeral and scapulothoracic musculature   Rationale: increase ROM, increase strength and increase proprioception  to improve the patients ability to improve ease with return to PLOF        With   [] TE   [] TA   [] neuro   [] other: Patient Education: [x] Review HEP    [] Progressed/Changed HEP based on:   [] positioning   [] body mechanics   [] transfers   [] heat/ice application    [] other:      Other Objective/Functional Measures: See goals below. Pain Level (0-10 scale) post treatment: 0    ASSESSMENT/Changes in Function: At this time patient suitable for discharge with patient having met most created therapeutic goals and independent with progressive HEP to allow for independent progression post-discharge.  Patient has initiated independent workout regime, with modification, with patient subjectively reporting comfort with independent progressive HEP. Patient demonstrates right shoulder AROM and strength WFL with patient denying functional limitations secondary to the right shoulder. []  See Plan of Care  []  See progress note/recertification  [x]  See Discharge Summary         Progress towards goals / Updated goals:    Short Term Goals: To be accomplished in 2 weeks:  1. Patient will subjectively report full compliance with prescribed HEP. At Last PN: Met, Patient reports compliance with her HEP.  12/8/2020  2. Patient will demonstrate right shoulder ER (0 deg abd) PROM = 40 degrees to improve ease with upper body dressing. At Last PN: Met, Right Shoulder ER (0 deg abduction) = 40 degrees, 1/22/2021  3. Patient will demonstrate right shoulder flexion and scaption AAROM >/= 90 degrees to improve ease with return to PLOF. At Last PN: Met, Right Shoulder Flexion AAROM 145 deg, Right Shoulder Scaption AAROM 130 degrees, 1/22/2021     Long Term Goals: To be accomplished in 4 weeks:  1. Patient will demonstrate a significant functional improvement as demonstrated by a score of >/= 57 on FOTO. At Last PN: Met, FOTO = FOTO = 61, 1/22/2021  2. Patient will demonstrate right shoulder flexion and scaption MMT >/= 4/5 to improve ease with household ADLs. At Last PN: Met, Right Shoulder Flexion MMT 4+/5, Right Shoulder Scaption MMT 4+/5, 1/22/2021  3. Patient will demonstrate right shoulder functional ER >/= Occiput to improve ease with self-care ADLs. At Last PN: Met, Right Shoulder Functional ER = Occiput, 12/29/2020    Goals for this certification period: To be completed in 4 weeks  1. Patient will be independent with prescribed home exercise program to ensure success upon discharge. 1/22/2021: Not provided  Current: Met, Patient independent with prescribed HEP in preparation for discharge, 2/10/2021  2.  Patient will demonstrate right shoulder flexion MMT 5/5 to improve ease with overhead lifting.  1/22/2021: Right Shoulder Flexion MMT = 4+/5  Current: Met, Shoulder Flexion MMT = 5/5, 2/10/2021    PLAN  []  Upgrade activities as tolerated     []  Continue plan of care  []  Update interventions per flow sheet       [x]  Discharge due to:_  []  Other:_      Dea Montes, PT 2/10/2021  8:37 AM    Future Appointments   Date Time Provider Claire Johnson   2/10/2021 11:45 AM Perri Henderson, PT [facility] [address]

## 2021-02-16 ENCOUNTER — APPOINTMENT (OUTPATIENT)
Dept: PHYSICAL THERAPY | Age: 71
End: 2021-02-16
Payer: MEDICARE

## 2021-02-17 ENCOUNTER — APPOINTMENT (OUTPATIENT)
Dept: PHYSICAL THERAPY | Age: 71
End: 2021-02-17
Payer: MEDICARE

## 2022-02-09 ENCOUNTER — TRANSCRIBE ORDER (OUTPATIENT)
Dept: SCHEDULING | Age: 72
End: 2022-02-09

## 2022-02-09 DIAGNOSIS — R94.31 ABNORMAL EKG: Primary | ICD-10-CM

## 2022-04-06 ENCOUNTER — HOSPITAL ENCOUNTER (OUTPATIENT)
Dept: NON INVASIVE DIAGNOSTICS | Age: 72
Discharge: HOME | End: 2022-04-06
Attending: INTERNAL MEDICINE
Payer: MEDICARE

## 2022-04-06 VITALS
SYSTOLIC BLOOD PRESSURE: 154 MMHG | BODY MASS INDEX: 29.88 KG/M2 | HEIGHT: 64 IN | DIASTOLIC BLOOD PRESSURE: 90 MMHG | WEIGHT: 175 LBS

## 2022-04-06 DIAGNOSIS — R94.31 ABNORMAL EKG: ICD-10-CM

## 2022-04-06 LAB
ECHO AO ASC DIAM: 3.1 CM
ECHO AO ASCENDING AORTA INDEX: 1.68 CM/M2
ECHO AO ROOT DIAM: 3.1 CM
ECHO AO ROOT INDEX: 1.68 CM/M2
ECHO AV AREA PEAK VELOCITY: 2.8 CM2
ECHO AV AREA VTI: 2.6 CM2
ECHO AV AREA/BSA PEAK VELOCITY: 1.5 CM2/M2
ECHO AV AREA/BSA VTI: 1.4 CM2/M2
ECHO AV MEAN GRADIENT: 6 MMHG
ECHO AV MEAN VELOCITY: 1.1 M/S
ECHO AV PEAK GRADIENT: 8 MMHG
ECHO AV PEAK VELOCITY: 1.4 M/S
ECHO AV VELOCITY RATIO: 0.71
ECHO AV VTI: 32.8 CM
ECHO LA AREA 2C: 15.1 CM2
ECHO LA AREA 4C: 15 CM2
ECHO LA DIAMETER INDEX: 2 CM/M2
ECHO LA DIAMETER: 3.7 CM
ECHO LA MAJOR AXIS: 4.8 CM
ECHO LA MINOR AXIS: 5.2 CM
ECHO LA TO AORTIC ROOT RATIO: 1.19
ECHO LA VOL BP: 37 ML (ref 22–52)
ECHO LA VOL/BSA BIPLANE: 20 ML/M2 (ref 16–34)
ECHO LV EDV A2C: 61 ML
ECHO LV EDV A4C: 64 ML
ECHO LV EDV INDEX A4C: 35 ML/M2
ECHO LV EDV NDEX A2C: 33 ML/M2
ECHO LV EJECTION FRACTION A2C: 63 %
ECHO LV EJECTION FRACTION A4C: 60 %
ECHO LV EJECTION FRACTION BIPLANE: 62 % (ref 55–100)
ECHO LV ESV A2C: 22 ML
ECHO LV ESV A4C: 26 ML
ECHO LV ESV INDEX A2C: 12 ML/M2
ECHO LV ESV INDEX A4C: 14 ML/M2
ECHO LV FRACTIONAL SHORTENING: 32 % (ref 28–44)
ECHO LV INTERNAL DIMENSION DIASTOLE INDEX: 2.22 CM/M2
ECHO LV INTERNAL DIMENSION DIASTOLIC: 4.1 CM (ref 3.9–5.3)
ECHO LV INTERNAL DIMENSION SYSTOLIC INDEX: 1.51 CM/M2
ECHO LV INTERNAL DIMENSION SYSTOLIC: 2.8 CM
ECHO LV IVSD: 1 CM (ref 0.6–0.9)
ECHO LV MASS 2D: 141.5 G (ref 67–162)
ECHO LV MASS INDEX 2D: 76.5 G/M2 (ref 43–95)
ECHO LV POSTERIOR WALL DIASTOLIC: 1.1 CM (ref 0.6–0.9)
ECHO LV RELATIVE WALL THICKNESS RATIO: 0.54
ECHO LVOT AREA: 4.2 CM2
ECHO LVOT AV VTI INDEX: 0.63
ECHO LVOT DIAM: 2.3 CM
ECHO LVOT MEAN GRADIENT: 3 MMHG
ECHO LVOT PEAK GRADIENT: 4 MMHG
ECHO LVOT PEAK VELOCITY: 1 M/S
ECHO LVOT STROKE VOLUME INDEX: 46 ML/M2
ECHO LVOT SV: 85.1 ML
ECHO LVOT VTI: 20.5 CM
ECHO MV A VELOCITY: 0.94 M/S
ECHO MV AREA VTI: 3 CM2
ECHO MV E DECELERATION TIME (DT): 332 MS
ECHO MV E VELOCITY: 0.79 M/S
ECHO MV E/A RATIO: 0.84
ECHO MV LVOT VTI INDEX: 1.4
ECHO MV MAX VELOCITY: 1.1 M/S
ECHO MV MEAN GRADIENT: 2 MMHG
ECHO MV MEAN VELOCITY: 0.7 M/S
ECHO MV PEAK GRADIENT: 5 MMHG
ECHO MV VTI: 28.6 CM
ECHO PV MAX VELOCITY: 1.1 M/S
ECHO PV PEAK GRADIENT: 5 MMHG
ECHO RA AREA 4C: 10.8 CM2
ECHO RV TAPSE: 2.1 CM (ref 1.5–2)
ECHO TV REGURGITANT MAX VELOCITY: 2.42 M/S
ECHO TV REGURGITANT PEAK GRADIENT: 23 MMHG

## 2022-04-06 PROCEDURE — 93306 TTE W/DOPPLER COMPLETE: CPT

## 2022-08-13 NOTE — PROGRESS NOTES
PT DAILY TREATMENT NOTE - [facility]     Patient Name: Alen Hernandez  Date:2018  : 1950  [x]  Patient  Verified  Payor: VA MEDICARE / Plan: VA MEDICARE PART A & B / Product Type: Medicare /    In time:8:30  Out time:9:36  Total Treatment Time (min): 66  Total Timed Codes (min): 66  1:1 Treatment Time ( [address] only): 45   Visit #: 13 of 16    Treatment Area: Left shoulder pain [M25.512]    SUBJECTIVE  Pain Level (0-10 scale): 0  Any medication changes, allergies to medications, adverse drug reactions, diagnosis change, or new procedure performed?: [x] No    [] Yes (see summary sheet for update)  Subjective functional status/changes:   [] No changes reported  Pt reports she has been feeling good, and not been having too much pain. OBJECTIVE        Min Type Additional Details    [] Estim:  []Unatt       []IFC  []Premod                        []Other:  []w/ice   []w/heat  Position:  Location:    [] Estim: []Att    []TENS instruct  []NMES                    []Other:  []w/US   []w/ice   []w/heat  Position:  Location:    []  Traction: [] Cervical       []Lumbar                       [] Prone          []Supine                       []Intermittent   []Continuous Lbs:  [] before manual  [] after manual    []  Ultrasound: []Continuous   [] Pulsed                           []1MHz   []3MHz W/cm2:  Location:    []  Iontophoresis with dexamethasone         Location: [] Take home patch   [] In clinic    []  Ice     []  heat  []  Ice massage  []  Laser   []  Anodyne Position:  Location:    []  Laser with stim  []  Other:  Position:  Location:    []  Vasopneumatic Device Pressure:       [] lo [] med [] hi   Temperature: [] lo [] med [] hi   [] Skin assessment post-treatment:  []intact []redness- no adverse reaction    []redness  adverse reaction:        41 min Therapeutic Exercise:  [x] See flow sheet :   Rationale: increase ROM and increase strength to improve the patients ability to increase tolerance to activities. Pt arrives to the ED via EMS with chief complaint of left arm pain with numbness and tingling that started 6hrs ago. Pt denies any injury to the arm.            15 min Neuromuscular Re-education:  [x]  See flow sheet :scapular stabilization. Rationale: increase ROM and increase strength  to improve the patients ability to increase ease with lifting activities.     10 min Manual Therapy:  STJ mobs, TPR to left UT and LS, manual str in pain free protocol approved ranges flex and scap, ER, sub scap release. Rationale: decrease pain, increase ROM, increase tissue extensibility and decrease trigger points to increase ease with ADLs.                                                                                            With   [] TE   [] TA   [] neuro   [] other: Patient Education: [x] Review HEP    [] Progressed/Changed HEP based on:   [] positioning   [] body mechanics   [] transfers   [] heat/ice application    [] other:      Other Objective/Functional Measures: see goals. Pain Level (0-10 scale) post treatment: 0    ASSESSMENT/Changes in Function: pt has progressed well toward LTGs. Pt's PROM of left shoulder is WNL. Pt continues to remain limited with AROM and strength. Patient will continue to benefit from skilled PT services to modify and progress therapeutic interventions, address functional mobility deficits, address ROM deficits, address strength deficits and analyze and address soft tissue restrictions to attain remaining goals. []  See Plan of Care  [x]  See progress note/recertification  []  See Discharge Summary         Progress towards goals / Updated goals:  Short Term Goals: To be accomplished in 3 weeks:  1. Pt will be independent and complaint w/ HEP to progress gains in PT. At PN: Goal met: Pt reports performing HEP. 6/1/18  2. Pt will improve PROM to full flex and scap left shoulder for ease w/ sleeping. At PN: Progressing, Flexion 130 degrees, Scaption 150 degrees, 6/12/2018  Current: Goal met, Flexion 155 degrees, Scpation 161 degrees, 7/13/2018  3.  Pt will improve AAROM to > or = to 120 deg flex and scap as measured on pulleys for ease w/ dressing. At PN: Goal met: AAROM flexion and scaption 125 deg with pulleys. 6/14/18  Long Term Goals: To be accomplished in 8 weeks:  1. Pt will improve FOTO to > or = to 63 to demo improved function. At PN: Progressing: FOTO = 50, increased by 14 since [employer]. 6/14/18  Current: Remains: FOTO = 50, increased by 14 since [employer]. 7/6/18  2. 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 At PN: Progressing: AROM flexion 55 deg, scap 50 deg. 6/14/18  Current: Progressing: AROM flexion 63 deg, scap 54 deg. 7/10/18  3. Pt will improve AROM right shoulder functional ER to > or = to occiput for ease putting on her shirt. At eval: NT  Current: Progressing, Left Shoulder Functional ER = Left Ear, 7/13/2018  4. Pt will improve MMT left shoulder to > or = to -4-4/5 grossly for ease w/ return to unrestricted ADLs.    At Eval: NT  Current: Not met: flexion 3-/5, abd 3-/5, IR 3/5, ER 4/5 7/13/18    PLAN  []  Upgrade activities as tolerated     [x]  Continue plan of care  []  Update interventions per flow sheet       []  Discharge due to:_  []  Other:_      Kristen Clark PTA 7/13/2018  8:31 AM    Future Appointments  Date Time Provider Claire Johnson   7/17/2018 8:30 AM Kristen Clark PTA [facility]   7/19/2018 9:30 AM Frankie Nieto PT [facility]   7/24/2018 8:30 AM Kristen Clark PTA [facility]   7/26/2018 8:30 AM Jeff Dowell, PT [facility]

## 2023-05-18 RX ORDER — DIAZEPAM 10 MG/1
5 TABLET ORAL
COMMUNITY

## 2023-05-18 RX ORDER — IBUPROFEN 200 MG
600 TABLET ORAL EVERY 6 HOURS PRN
COMMUNITY